# Patient Record
Sex: FEMALE | Race: WHITE | NOT HISPANIC OR LATINO | Employment: STUDENT | URBAN - METROPOLITAN AREA
[De-identification: names, ages, dates, MRNs, and addresses within clinical notes are randomized per-mention and may not be internally consistent; named-entity substitution may affect disease eponyms.]

---

## 2017-01-12 ENCOUNTER — PROCEDURE VISIT (OUTPATIENT)
Dept: OBSTETRICS AND GYNECOLOGY | Facility: CLINIC | Age: 32
End: 2017-01-12

## 2017-01-12 ENCOUNTER — ROUTINE PRENATAL (OUTPATIENT)
Dept: OBSTETRICS AND GYNECOLOGY | Facility: CLINIC | Age: 32
End: 2017-01-12

## 2017-01-12 VITALS — WEIGHT: 192.2 LBS | SYSTOLIC BLOOD PRESSURE: 132 MMHG | DIASTOLIC BLOOD PRESSURE: 70 MMHG

## 2017-01-12 DIAGNOSIS — Z36.89 ENCOUNTER FOR ULTRASOUND TO CHECK FETAL GROWTH: Primary | ICD-10-CM

## 2017-01-12 DIAGNOSIS — Z34.03 ENCOUNTER FOR SUPERVISION OF NORMAL FIRST PREGNANCY IN THIRD TRIMESTER: Primary | ICD-10-CM

## 2017-01-12 LAB
BILIRUB BLD-MCNC: NEGATIVE MG/DL
GLUCOSE UR STRIP-MCNC: NEGATIVE MG/DL
KETONES UR QL: NEGATIVE
LEUKOCYTE EST, POC: NEGATIVE
NITRITE UR-MCNC: NEGATIVE MG/ML
PH UR: 7 [PH] (ref 5–8)
PROT UR STRIP-MCNC: ABNORMAL MG/DL
RBC # UR STRIP: ABNORMAL /UL
SP GR UR: 1.02 (ref 1–1.03)
UROBILINOGEN UR QL: NORMAL

## 2017-01-12 PROCEDURE — 76816 OB US FOLLOW-UP PER FETUS: CPT | Performed by: OBSTETRICS & GYNECOLOGY

## 2017-01-12 PROCEDURE — 81002 URINALYSIS NONAUTO W/O SCOPE: CPT | Performed by: OBSTETRICS & GYNECOLOGY

## 2017-01-12 PROCEDURE — 99213 OFFICE O/P EST LOW 20 MIN: CPT | Performed by: OBSTETRICS & GYNECOLOGY

## 2017-01-12 NOTE — MR AVS SNAPSHOT
Cristina Magana   1/12/2017 3:40 PM   Appointment    Dept Phone:  [phone]   Encounter #:  [id number]    Provider:  ULTRASOUND LPFW [place]   Department:  [facility] OB GYN                Your Full Care Plan              Your Updated Medication List          This list is accurate as of: 1/12/17  3:59 PM.  Always use your most recent med list.                atorvastatin 20 MG tablet   Commonly known as:  LIPITOR       fenofibrate 145 MG tablet   Commonly known as:  TRICOR       * prenatal (CLASSIC) vitamin 28-0.8 MG tablet tablet       * prenatal vitamins 27-1 MG tablet tablet       vitamin D 45710 UNITS capsule capsule   Commonly known as:  ERGOCALCIFEROL       * Notice:  This list has 2 medication(s) that are the same as other medications prescribed for you. Read the directions carefully, and ask your doctor or other care provider to review them with you.            Instructions     None    Patient Instructions History      Upcoming Appointments     Visit Type Date Time Department    OB FOLLOWUP 1/12/2017  1:40 PM [facility] [place]    [facility]     Our records indicate that your [facility] nGAP account has been deactivated. If you would like to reactivate your account, please email [email or web address] or call [phone] to talk to our nG[employer] staff.             Other Info from Your Visit           Allergies     No Known Allergies      Vital Signs     Last Menstrual Period Smoking Status                04/18/2016 Never Smoker

## 2017-01-12 NOTE — MR AVS SNAPSHOT
Cristina Magana   1/12/2017 1:40 PM   Routine Prenatal    Dept Phone:  [phone]   Encounter #:  [id number]    Provider:  Julian Bacon MD   Department:  [facility] OB GYN                Your Full Care Plan              Your Updated Medication List          This list is accurate as of: 1/12/17  2:50 PM.  Always use your most recent med list.                atorvastatin 20 MG tablet   Commonly known as:  LIPITOR       fenofibrate 145 MG tablet   Commonly known as:  TRICOR       * prenatal (CLASSIC) vitamin 28-0.8 MG tablet tablet       * prenatal vitamins 27-1 MG tablet tablet       vitamin D 67708 UNITS capsule capsule   Commonly known as:  ERGOCALCIFEROL       * Notice:  This list has 2 medication(s) that are the same as other medications prescribed for you. Read the directions carefully, and ask your doctor or other care provider to review them with you.            We Performed the Following     POC Urinalysis Dipstick       You Were Diagnosed With        Codes Comments    Encounter for supervision of normal first pregnancy in third trimester    -  Primary ICD-10-CM: Z34.03  ICD-9-CM: V22.0       Instructions     None    Patient Instructions History      Upcoming Appointments     Visit Type Date Time Department    OB FOLLOWUP 1/12/2017  1:40 PM [facility] [place]    ULTRASOUND 1/12/2017  3:40 PM [facility] [place]      [facility] Signup     Our records indicate that your [facility] Link[employer] account has been deactivated. If you would like to reactivate your account, please email [email or web address] or call [phone] to talk to our Link[employer] staff.             Other Info from Your Visit           Your Appointments     Jan 12, 2017  3:40 PM EST   Ultrasound with ULTRASOUND LPF[facility] (--)    [facility] [place] [address]   [phone]              Allergies     No Known Allergies      Vital Signs     Blood  Pressure Weight Last Menstrual Period Smoking Status          132/70 192 lb 3.2 oz (87.2 kg) 04/18/2016 Never Smoker        Problems and Diagnoses Noted     Prenatal care    -  Primary      Results     POC Urinalysis Dipstick      Component Value Standard Range & Units    Glucose, UA Negative Negative, 1000 mg/dL (3+) mg/dL    Bilirubin Negative Negative    Ketones, UA Negative Negative    Specific Gravity  1.025 1.005 - 1.030    Blood, UA Trace Negative    pH, Urine 7.0 5.0 - 8.0    Protein,  mg/dL Negative mg/dL    Urobilinogen, UA Normal Normal    Leukocytes Negative Negative    Nitrite, UA Negative Negative

## 2017-01-26 ENCOUNTER — OFFICE VISIT (OUTPATIENT)
Dept: OBSTETRICS AND GYNECOLOGY | Facility: CLINIC | Age: 32
End: 2017-01-26

## 2017-01-26 VITALS
SYSTOLIC BLOOD PRESSURE: 120 MMHG | DIASTOLIC BLOOD PRESSURE: 74 MMHG | BODY MASS INDEX: 29.52 KG/M2 | WEIGHT: 177.2 LBS | HEIGHT: 65 IN

## 2017-01-26 PROCEDURE — 99213 OFFICE O/P EST LOW 20 MIN: CPT | Performed by: NURSE PRACTITIONER

## 2017-01-26 RX ORDER — ALUMINUM CHLORIDE 20 %
SOLUTION, NON-ORAL TOPICAL
COMMUNITY
Start: 2016-12-21 | End: 2017-01-26

## 2017-01-26 RX ORDER — OXYCODONE AND ACETAMINOPHEN 7.5; 325 MG/1; MG/1
1-2 TABLET ORAL EVERY 6 HOURS
COMMUNITY
Start: 2017-01-23 | End: 2018-08-20

## 2017-01-26 RX ORDER — IBUPROFEN 800 MG/1
800 TABLET ORAL EVERY 8 HOURS
COMMUNITY
Start: 2017-01-23 | End: 2018-02-26 | Stop reason: SDUPTHER

## 2017-01-26 NOTE — PROGRESS NOTES
Subjective   Cristina Magana is a 31 y.o. female presents for staple removal    History of Present Illness  Cristina is 7 days s/p  section. She is here for staple removal. No complaints today. Pain well controlled with percocet and ibuprofen. Reports normal bowel and bladder function. Denies fever. Denies depressed mood and reports good bonding with baby. Baby doing well per pt.     The following portions of the patient's history were reviewed and updated as appropriate: allergies, current medications, past family history, past medical history, past social history, past surgical history and problem list.    Review of Systems   Constitutional: Negative for chills, fatigue and fever.   Cardiovascular: Negative for leg swelling.   Gastrointestinal: Negative for abdominal distention and abdominal pain.   Genitourinary: Negative for difficulty urinating, dysuria, menstrual problem, pelvic pain, vaginal bleeding, vaginal discharge and vaginal pain.     Objective   Physical Exam  Cristina is alert and oriented and in no acute distress  Abdomen soft, nontender, nondistended. BS active.  Low transverse incision well approximated and healing well. Staples intact. No erythema, induration, warmth, or drainage noted. Mild swelling only.  Extremities with no swelling.    Assessment/Plan   Cristina was seen today for follow-up.    Diagnoses and all orders for this visit:    Postpartum care following  delivery      Staples removed without difficulty. Steri strips placed. Reinforced signs and symptoms of infection. Care instructions given. She is to f/u 1 week for incision check. To call for any problems before then including increasing pain or bleeding, fever, depression, or s/s infection.    Pily Faulkner, SUSAN

## 2017-01-26 NOTE — MR AVS SNAPSHOT
Cristina Magana   1/26/2017 2:30 PM   Office Visit    Dept Phone:  [phone]   Encounter #:  [id number]    Provider:  SUSAN Carpenter   Department:  [facility]                Your Full Care Plan              Today's Medication Changes          These changes are accurate as of: 1/26/17  3:35 PM.  If you have any questions, ask your nurse or doctor.               Medication(s)that have changed:     prenatal (CLASSIC) vitamin 28-0.8 MG tablet tablet   Take  by mouth daily.   What changed:  Another medication with the same name was removed. Continue taking this medication, and follow the directions you see here.   Changed by:  Julian Bacon MD         Stop taking medication(s)listed here:     atorvastatin 20 MG tablet   Commonly known as:  LIPITOR   Stopped by:  SUSAN Carpenter           DRYSOL 20 % external solution   Generic drug:  aluminum chloride   Stopped by:  SUSAN Carpenter                      Your Updated Medication List          This list is accurate as of: 1/26/17  3:35 PM.  Always use your most recent med list.                fenofibrate 145 MG tablet   Commonly known as:  TRICOR       ibuprofen 800 MG tablet   Commonly known as:  ADVIL,MOTRIN       oxyCODONE-acetaminophen 7.5-325 MG per tablet   Commonly known as:  PERCOCET       prenatal (CLASSIC) vitamin 28-0.8 MG tablet tablet       vitamin D 43957 UNITS capsule capsule   Commonly known as:  ERGOCALCIFEROL               Instructions     None    Patient Instructions History      Upcoming Appointments     Visit Type Date Time Department    GYN FOLLOW UP 1/26/2017  2:30 PM [facility] O[facility]YN [facility] [place]      Client Outlook Signup     [facility] Client Outlook allows you to send messages to your doctor, view your test results, renew your prescriptions, schedule appointments, and more. To sign up, go to Path.To and click on the Sign Up Now link in the New User? box. Enter your Client Outlook Activation  "Code exactly as it appears below along with the last four digits of your Social Security Number and your Date of Birth () to complete the sign-up process. If you do not sign up before the expiration date, you must request a new code.    [employer] Activation Code: [email or web address]  Expires: 2017  3:35 PM    If you have questions, you can email [email or web address] or call [phone] to talk to our [employer] staff. Remember, [employer] is NOT to be used for urgent needs. For medical emergencies, dial 911.               Other Info from Your Visit           Allergies     No Known Allergies      Reason for Visit     Follow-up 1 week out from the delivery      Vital Signs     Blood Pressure Height Weight Last Menstrual Period Body Mass Index Smoking Status    120/74 64.5\" (163.8 cm) 177 lb 3.2 oz (80.4 kg) 2016 29.95 kg/m2 Never Smoker        "

## 2017-01-30 ENCOUNTER — TELEPHONE (OUTPATIENT)
Dept: OBSTETRICS AND GYNECOLOGY | Facility: CLINIC | Age: 32
End: 2017-01-30

## 2017-02-06 ENCOUNTER — OFFICE VISIT (OUTPATIENT)
Dept: OBSTETRICS AND GYNECOLOGY | Facility: CLINIC | Age: 32
End: 2017-02-06

## 2017-02-06 VITALS
BODY MASS INDEX: 28.26 KG/M2 | SYSTOLIC BLOOD PRESSURE: 130 MMHG | HEIGHT: 65 IN | WEIGHT: 169.6 LBS | DIASTOLIC BLOOD PRESSURE: 78 MMHG

## 2017-02-06 DIAGNOSIS — T14.8XXA HEMATOMA: ICD-10-CM

## 2017-02-06 DIAGNOSIS — Z09 POSTOP CHECK: Primary | ICD-10-CM

## 2017-02-06 PROCEDURE — 99212 OFFICE O/P EST SF 10 MIN: CPT | Performed by: OBSTETRICS & GYNECOLOGY

## 2017-02-06 RX ORDER — OXYCODONE HYDROCHLORIDE AND ACETAMINOPHEN 5; 325 MG/1; MG/1
1 TABLET ORAL EVERY 4 HOURS PRN
Qty: 40 TABLET | Refills: 0
Start: 2017-02-06 | End: 2018-08-20

## 2017-02-06 RX ORDER — IBUPROFEN 800 MG/1
800 TABLET ORAL EVERY 8 HOURS PRN
Qty: 30 TABLET | Refills: 2
Start: 2017-02-06 | End: 2020-09-17

## 2017-02-06 NOTE — PROGRESS NOTES
Subjective  CC Incision check     Cristina Magana is a 31 y.o. female.     History of Present Illness Pt here 2 weeks postpartum for incision check. Was seen a week ago and noted to have small hematoma of incision which patient feels it is smaller today.  She has had no increased tenderness or erythema of the incision and no drainage.  She is having no fever or other problems.        Review of Systems  As in HPI only   Objective   Physical Exam  Incision today has no erythema and no unusual tenderness.  There is puffiness about an inch above the complete incision consistent with a soft hematoma but this obviously has not increased in size from last week.  Assessment/Plan   Cristina was seen today for post-op.    Diagnoses and all orders for this visit:    Postop check    Hematoma  -     CBC & Differential     We'll obtain a CBC today and if incision is unchanged patient will return to the office for her 6 week postpartum visit.  Patient has been given careful instructions to call or go to the emergency room for fever, increased tenderness or redness, expansion of the hematoma etc.  She also has been counseled about the possibility of it spontaneously evacuating which would not be a bad thing.

## 2017-02-07 LAB
BASOPHILS # BLD AUTO: 0.02 10*3/MM3 (ref 0–0.2)
BASOPHILS NFR BLD AUTO: 0.5 % (ref 0–1.5)
EOSINOPHIL # BLD AUTO: 0.1 10*3/MM3 (ref 0–0.7)
EOSINOPHIL NFR BLD AUTO: 2.3 % (ref 0.3–6.2)
ERYTHROCYTE [DISTWIDTH] IN BLOOD BY AUTOMATED COUNT: 14.8 % (ref 11.7–13)
HCT VFR BLD AUTO: 46.3 % (ref 35.6–45.5)
HGB BLD-MCNC: 14.9 G/DL (ref 11.9–15.5)
IMM GRANULOCYTES # BLD: 0 10*3/MM3 (ref 0–0.03)
IMM GRANULOCYTES NFR BLD: 0 % (ref 0–0.5)
LYMPHOCYTES # BLD AUTO: 1.61 10*3/MM3 (ref 0.9–4.8)
LYMPHOCYTES NFR BLD AUTO: 36.5 % (ref 19.6–45.3)
MCH RBC QN AUTO: 30 PG (ref 26.9–32)
MCHC RBC AUTO-ENTMCNC: 32.2 G/DL (ref 32.4–36.3)
MCV RBC AUTO: 93.2 FL (ref 80.5–98.2)
MONOCYTES # BLD AUTO: 0.29 10*3/MM3 (ref 0.2–1.2)
MONOCYTES NFR BLD AUTO: 6.6 % (ref 5–12)
NEUTROPHILS # BLD AUTO: 2.39 10*3/MM3 (ref 1.9–8.1)
NEUTROPHILS NFR BLD AUTO: 54.1 % (ref 42.7–76)
PLATELET # BLD AUTO: 338 10*3/MM3 (ref 140–500)
RBC # BLD AUTO: 4.97 10*6/MM3 (ref 3.9–5.2)
WBC # BLD AUTO: 4.41 10*3/MM3 (ref 4.5–10.7)

## 2017-03-09 ENCOUNTER — POSTPARTUM VISIT (OUTPATIENT)
Dept: OBSTETRICS AND GYNECOLOGY | Facility: CLINIC | Age: 32
End: 2017-03-09

## 2017-03-09 ENCOUNTER — PROCEDURE VISIT (OUTPATIENT)
Dept: OBSTETRICS AND GYNECOLOGY | Facility: CLINIC | Age: 32
End: 2017-03-09

## 2017-03-09 VITALS — SYSTOLIC BLOOD PRESSURE: 122 MMHG | DIASTOLIC BLOOD PRESSURE: 66 MMHG | HEIGHT: 64 IN

## 2017-03-09 DIAGNOSIS — N83.201 CYSTS OF BOTH OVARIES: Primary | ICD-10-CM

## 2017-03-09 DIAGNOSIS — N83.202 CYSTS OF BOTH OVARIES: Primary | ICD-10-CM

## 2017-03-09 PROCEDURE — 99213 OFFICE O/P EST LOW 20 MIN: CPT | Performed by: OBSTETRICS & GYNECOLOGY

## 2017-03-09 PROCEDURE — 76830 TRANSVAGINAL US NON-OB: CPT | Performed by: OBSTETRICS & GYNECOLOGY

## 2017-03-09 RX ORDER — MEDROXYPROGESTERONE ACETATE 150 MG/ML
150 INJECTION, SUSPENSION INTRAMUSCULAR
Qty: 1 ML | Refills: 3
Start: 2017-03-09 | End: 2020-09-17 | Stop reason: SDUPTHER

## 2017-03-09 NOTE — PROGRESS NOTES
Subjective    Chief Complaint   Patient presents with   • Postpartum Care     PP C SECTION 17, FEMALE 6LBS 5OZ, BOTTLE FEEDING, DISCUSS BIRTH CONTROL OPTIONS, PENTA, STILL HAVING VAG BLEEDING, INCISON IS MUCH BETTER      History of Present Illness    Cristina Magana is a 31 y.o. female who presents for postpartum exam. Pap due 2017.  Patient would like Depo-Provera shot for birth control although has not been sexually active for months.  Patient is currently on her period and may return to the office Monday for her first shot    Obstetric History:  OB History      Para Term  AB TAB SAB Ectopic Multiple Living    3 1 1  1  1   2         Menstrual History:     Patient's last menstrual period was 2016.       Past Medical History   Diagnosis Date   • Abnormal Pap smear of cervix    • Gestational hypertension    • Vitamin D deficiency      Family History   Problem Relation Age of Onset   • Hypertension Mother    • Ovarian cancer Sister    • Hypertension Sister    • Vitamin D deficiency Sister        The following portions of the patient's history were reviewed and updated as appropriate: allergies, current medications, past family history, past medical history, past social history, past surgical history and problem list.    Review of Systems   Constitutional: Negative.  Negative for fever and unexpected weight change.   HENT: Negative.    Respiratory: Negative for shortness of breath and wheezing.    Cardiovascular: Negative for chest pain, palpitations and leg swelling.   Gastrointestinal: Negative for abdominal pain, anal bleeding and blood in stool.   Genitourinary: Negative for dysuria, pelvic pain, urgency, vaginal bleeding, vaginal discharge and vaginal pain.   Skin: Negative.    Neurological: Negative.    Hematological: Negative.  Negative for adenopathy.   Psychiatric/Behavioral: Negative.  Negative for dysphoric mood. The patient is not nervous/anxious.             Objective   Physical  "Exam   Constitutional: She is oriented to person, place, and time. Vital signs are normal. She appears well-developed and well-nourished.   HENT:   Head: Normocephalic.   Neck: Trachea normal. No tracheal deviation present. No thyromegaly present.   Cardiovascular: Normal rate, regular rhythm and normal heart sounds.    No murmur heard.  Pulmonary/Chest: Effort normal and breath sounds normal.   Breasts without masses, tenderness or nipple discharge   Abdominal: Soft. Normal appearance. She exhibits no mass. There is no hepatosplenomegaly. There is no tenderness. No hernia.   Incision well-healed today with no evidence of hematoma or infection.   Genitourinary: Rectum normal, vagina normal and uterus normal. Uterus is not enlarged and not tender. Cervix exhibits no motion tenderness. Right adnexum displays no mass and no tenderness. Left adnexum displays no mass and no tenderness. No vaginal discharge found.   Genitourinary Comments: External genitalia normal    Lymphadenopathy:     She has no cervical adenopathy.     She has no axillary adenopathy.   Neurological: She is alert and oriented to person, place, and time.   Skin: Skin is warm and dry. No rash noted.   Psychiatric: She has a normal mood and affect. Her behavior is normal. Cognition and memory are normal.       Visit Vitals   • /66   • Ht 64\" (162.6 cm)   • LMP 04/18/2016   • Breastfeeding Unknown       Assessment/Plan   Cristina was seen today for postpartum care.    Diagnoses and all orders for this visit:    Encounter for postpartum visit    Other orders  -     medroxyPROGESTERone (DEPO-PROVERA) 150 MG/ML injection; Inject 1 mL into the shoulder, thigh, or buttocks Every 3 (Three) Months.      rto 4 months              "

## 2017-06-22 ENCOUNTER — PROCEDURE VISIT (OUTPATIENT)
Dept: OBSTETRICS AND GYNECOLOGY | Facility: CLINIC | Age: 32
End: 2017-06-22

## 2017-06-22 ENCOUNTER — OFFICE VISIT (OUTPATIENT)
Dept: OBSTETRICS AND GYNECOLOGY | Facility: CLINIC | Age: 32
End: 2017-06-22

## 2017-06-22 VITALS
SYSTOLIC BLOOD PRESSURE: 124 MMHG | BODY MASS INDEX: 27.72 KG/M2 | WEIGHT: 162.4 LBS | HEIGHT: 64 IN | DIASTOLIC BLOOD PRESSURE: 72 MMHG

## 2017-06-22 DIAGNOSIS — Z01.411 ENCOUNTER FOR GYNECOLOGICAL EXAMINATION WITH ABNORMAL FINDING: Primary | ICD-10-CM

## 2017-06-22 DIAGNOSIS — N83.201 CYST OF RIGHT OVARY: ICD-10-CM

## 2017-06-22 DIAGNOSIS — N83.201 CYST OF RIGHT OVARY: Primary | ICD-10-CM

## 2017-06-22 DIAGNOSIS — N89.8 VAGINAL DISCHARGE: ICD-10-CM

## 2017-06-22 PROCEDURE — 99213 OFFICE O/P EST LOW 20 MIN: CPT | Performed by: OBSTETRICS & GYNECOLOGY

## 2017-06-22 PROCEDURE — 76830 TRANSVAGINAL US NON-OB: CPT | Performed by: OBSTETRICS & GYNECOLOGY

## 2017-06-22 NOTE — PROGRESS NOTES
Subjective    Chief Complaint   Patient presents with   • Gynecologic Exam     AE, NO PERIODS SINCE  DEPO, DISCUSS US, HX OVARIAN CYST,       History of Present Illness    Cristina Magana is a 32 y.o. female who presents for annual exam. Patient had a simple 8.5 cm right ovarian cyst and 6.3 cm left ovarian cyst 3 months ago and is here for follow-up.  The cysts were seen at .  She is currently on Depo-Provera in today a repeat scan shows a normal left ovary and a 4.8 cm simple right ovarian cyst.  Obviously these are resolving and we'll repeat an ultrasound in 3 months.  She also has a mild discharge and wants a culture.    Obstetric History:  OB History      Para Term  AB TAB SAB Ectopic Multiple Living    3 1 1  1  1   2         Menstrual History:     No LMP recorded.       Past Medical History:   Diagnosis Date   • Abnormal Pap smear of cervix    • Gestational hypertension    • Ovarian cyst    • Vitamin D deficiency      Family History   Problem Relation Age of Onset   • Hypertension Mother    • Ovarian cancer Sister    • Hypertension Sister    • Vitamin D deficiency Sister        The following portions of the patient's history were reviewed and updated as appropriate: allergies, current medications, past family history, past medical history, past social history, past surgical history and problem list.    Review of Systems   Constitutional: Negative.  Negative for fever and unexpected weight change.   HENT: Negative.    Respiratory: Negative for shortness of breath and wheezing.    Cardiovascular: Negative for chest pain, palpitations and leg swelling.   Gastrointestinal: Negative for abdominal pain, anal bleeding and blood in stool.   Genitourinary: Positive for vaginal discharge. Negative for dysuria, pelvic pain, urgency, vaginal bleeding and vaginal pain.   Skin: Negative.    Neurological: Negative.    Hematological: Negative.  Negative for adenopathy.   Psychiatric/Behavioral: Negative.   "Negative for dysphoric mood. The patient is not nervous/anxious.             Objective   Physical Exam   Constitutional: She is oriented to person, place, and time. Vital signs are normal. She appears well-developed and well-nourished.   HENT:   Head: Normocephalic.   Neck: Trachea normal. No tracheal deviation present. No thyromegaly present.   Cardiovascular: Normal rate, regular rhythm and normal heart sounds.    No murmur heard.  Pulmonary/Chest: Effort normal and breath sounds normal.   Breasts without masses, tenderness or nipple discharge   Abdominal: Soft. Normal appearance. She exhibits no mass. There is no hepatosplenomegaly. There is no tenderness. No hernia.   Genitourinary: Rectum normal and uterus normal. Uterus is not enlarged and not tender. Cervix exhibits no motion tenderness. Right adnexum displays no mass and no tenderness. Left adnexum displays no mass and no tenderness. Vaginal discharge found.   Genitourinary Comments: External genitalia normal    Lymphadenopathy:     She has no cervical adenopathy.     She has no axillary adenopathy.   Neurological: She is alert and oriented to person, place, and time.   Skin: Skin is warm and dry. No rash noted.   Psychiatric: She has a normal mood and affect. Her behavior is normal. Cognition and memory are normal.       /72  Ht 64\" (162.6 cm)  Wt 162 lb 6.4 oz (73.7 kg)  BMI 27.88 kg/m2    Assessment/Plan   Cristina was seen today for gynecologic exam.    Diagnoses and all orders for this visit:    Encounter for gynecological examination with abnormal finding  -     IGP,rfx Aptima HPV All Pth    Cyst of right ovary    Vaginal discharge  -     NuSwab VG+      Pelvic ultrasound.  and FU in 3 months                "

## 2017-06-24 LAB
CONV .: NORMAL
CYTOLOGIST CVX/VAG CYTO: NORMAL
CYTOLOGY CVX/VAG DOC THIN PREP: NORMAL
DX ICD CODE: NORMAL
HIV 1 & 2 AB SER-IMP: NORMAL
OTHER STN SPEC: NORMAL
PATH REPORT.FINAL DX SPEC: NORMAL
STAT OF ADQ CVX/VAG CYTO-IMP: NORMAL

## 2017-06-27 ENCOUNTER — TELEPHONE (OUTPATIENT)
Dept: OBSTETRICS AND GYNECOLOGY | Facility: HOSPITAL | Age: 32
End: 2017-06-27

## 2017-06-27 ENCOUNTER — TELEPHONE (OUTPATIENT)
Dept: OBSTETRICS AND GYNECOLOGY | Facility: CLINIC | Age: 32
End: 2017-06-27

## 2017-06-27 LAB
A VAGINAE DNA VAG QL NAA+PROBE: NORMAL SCORE
BVAB2 DNA VAG QL NAA+PROBE: NORMAL SCORE
C ALBICANS DNA VAG QL NAA+PROBE: NEGATIVE
C GLABRATA DNA VAG QL NAA+PROBE: NEGATIVE
C TRACH RRNA SPEC QL NAA+PROBE: NEGATIVE
MEGA1 DNA VAG QL NAA+PROBE: NORMAL SCORE
N GONORRHOEA RRNA SPEC QL NAA+PROBE: NEGATIVE
T VAGINALIS RRNA SPEC QL NAA+PROBE: NEGATIVE

## 2017-09-21 ENCOUNTER — OFFICE VISIT (OUTPATIENT)
Dept: OBSTETRICS AND GYNECOLOGY | Facility: CLINIC | Age: 32
End: 2017-09-21

## 2017-09-21 ENCOUNTER — PROCEDURE VISIT (OUTPATIENT)
Dept: OBSTETRICS AND GYNECOLOGY | Facility: CLINIC | Age: 32
End: 2017-09-21

## 2017-09-21 VITALS
HEIGHT: 67 IN | DIASTOLIC BLOOD PRESSURE: 74 MMHG | SYSTOLIC BLOOD PRESSURE: 132 MMHG | WEIGHT: 158 LBS | BODY MASS INDEX: 24.8 KG/M2

## 2017-09-21 DIAGNOSIS — N83.201 CYST OF RIGHT OVARY: Primary | ICD-10-CM

## 2017-09-21 DIAGNOSIS — N83.201 RIGHT OVARIAN CYST: Primary | ICD-10-CM

## 2017-09-21 PROCEDURE — 99212 OFFICE O/P EST SF 10 MIN: CPT | Performed by: OBSTETRICS & GYNECOLOGY

## 2017-09-21 PROCEDURE — 76830 TRANSVAGINAL US NON-OB: CPT | Performed by: OBSTETRICS & GYNECOLOGY

## 2017-09-21 NOTE — PROGRESS NOTES
Subjective CC follow-up on right ovarian cyst.  Cristina Magana is a 32 y.o. female.     History of Present Illness patient was noted at  8 months ago to have an extremely large right ovarian cyst well over 10 cm.  It is been followed an ultrasound today shows a very small 2.1 cm simple right ovarian cyst which is resolving nicely.  Patient having no symptoms.  Left ovary was normal.    The following portions of the patient's history were reviewed and updated as appropriate: past surgical history and problem list.    Review of Systems  As per hPI   Objective   Physical Exam  Not needed   Assessment/Plan   Cristina was seen today for follow-up.    Diagnoses and all orders for this visit:    Right ovarian cyst     Small simple right ovarian cyst obviously resolving and benign.  We'll recheck at next annual exam in 2018.

## 2018-02-26 ENCOUNTER — OFFICE VISIT (OUTPATIENT)
Dept: GASTROENTEROLOGY | Facility: CLINIC | Age: 33
End: 2018-02-26

## 2018-02-26 VITALS
DIASTOLIC BLOOD PRESSURE: 86 MMHG | SYSTOLIC BLOOD PRESSURE: 136 MMHG | WEIGHT: 153 LBS | TEMPERATURE: 98.3 F | HEIGHT: 65 IN | BODY MASS INDEX: 25.49 KG/M2

## 2018-02-26 DIAGNOSIS — R10.10 PAIN OF UPPER ABDOMEN: Primary | ICD-10-CM

## 2018-02-26 DIAGNOSIS — R10.30 LOWER ABDOMINAL PAIN: ICD-10-CM

## 2018-02-26 PROCEDURE — 99204 OFFICE O/P NEW MOD 45 MIN: CPT | Performed by: INTERNAL MEDICINE

## 2018-02-26 RX ORDER — DICYCLOMINE HCL 20 MG
TABLET ORAL
COMMUNITY
Start: 2018-02-22 | End: 2018-03-14 | Stop reason: SDUPTHER

## 2018-02-26 NOTE — PROGRESS NOTES
Chief Complaint   Patient presents with   • Abdominal Pain     all over (comes and goes)       Cristina Magana is a 32 y.o. female who presents with Abdominal pain for one year    HPI Comments: 32-year-old with abdominal pain for one year.  Is in the lower quadrants bilaterally.  Does not radiate.  Worse with movement.  Better at rest.  No relation to bowel movements.  It is crampy and colicky in nature.  She's had a full gynecological workup with pelvic ultrasound that was within normal limits.  Emergency room visit last week yielded a normal CAT scan, CBC and CMP.  She is here to schedule scopes for upper and lower abdominal pain.      Past Medical History:   Diagnosis Date   • Abnormal Pap smear of cervix    • Gestational hypertension    • Ovarian cyst    • Vitamin D deficiency        Past Surgical History:   Procedure Laterality Date   •  SECTION     • TONSILLECTOMY           Current Outpatient Prescriptions:   •  dicyclomine (BENTYL) 20 MG tablet, , Disp: , Rfl:   •  medroxyPROGESTERone (DEPO-PROVERA) 150 MG/ML injection, Inject 1 mL into the shoulder, thigh, or buttocks Every 3 (Three) Months., Disp: 1 mL, Rfl: 3  •  vitamin D (ERGOCALCIFEROL) 04163 UNITS capsule capsule, TAKE ONE CAPSULE BY MOUTH ONCE WEEKLY, Disp: , Rfl:   •  fenofibrate (TRICOR) 145 MG tablet, TAKE ONE TABLET BY MOUTH DAILY, Disp: , Rfl:   •  ibuprofen (ADVIL,MOTRIN) 800 MG tablet, Take 1 tablet by mouth Every 8 (Eight) Hours As Needed for mild pain (1-3)., Disp: 30 tablet, Rfl: 2  •  oxyCODONE-acetaminophen (PERCOCET) 5-325 MG per tablet, Take 1 tablet by mouth Every 4 (Four) Hours As Needed for severe pain (7-10)., Disp: 40 tablet, Rfl: 0  •  oxyCODONE-acetaminophen (PERCOCET) 7.5-325 MG per tablet, Take 1-2 tablets by mouth Every 6 (Six) Hours., Disp: , Rfl:   •  Prenatal Vit-Fe Fumarate-FA (PRENATAL, CLASSIC, VITAMIN) 28-0.8 MG tablet tablet, Take  by mouth daily., Disp: , Rfl:     No Known Allergies    Social History     Social  History   • Marital status: Single     Spouse name: N/A   • Number of children: N/A   • Years of education: N/A     Occupational History   • Not on file.     Social History Main Topics   • Smoking status: Current Every Day Smoker     Packs/day: 2.00   • Smokeless tobacco: Never Used      Comment: 2 PPD   • Alcohol use Yes      Comment: OCCAS BEFORE PREG   • Drug use: No   • Sexual activity: Not Currently     Partners: Male     Other Topics Concern   • Not on file     Social History Narrative       Family History   Problem Relation Age of Onset   • Hypertension Mother    • Ovarian cancer Sister    • Hypertension Sister    • Vitamin D deficiency Sister        Review of Systems   Gastrointestinal: Positive for abdominal distention, abdominal pain, anal bleeding, nausea and rectal pain. Negative for diarrhea.   All other systems reviewed and are negative.      Vitals:    02/26/18 1330   BP: 136/86   Temp: 98.3 °F (36.8 °C)       Physical Exam   Constitutional: She is oriented to person, place, and time. She appears well-developed and well-nourished.   HENT:   Head: Normocephalic and atraumatic.   Eyes: Pupils are equal, round, and reactive to light.   Cardiovascular: Normal rate, regular rhythm and normal heart sounds.    Pulmonary/Chest: Effort normal and breath sounds normal.   Abdominal: Soft. Bowel sounds are normal. She exhibits distension. She exhibits no shifting dullness, no pulsatile liver, no fluid wave, no abdominal bruit, no ascites, no pulsatile midline mass and no mass. There is no hepatosplenomegaly. There is tenderness. There is no rigidity and no guarding. No hernia.   Musculoskeletal: Normal range of motion.   Neurological: She is alert and oriented to person, place, and time.   Skin: Skin is warm and dry.   Psychiatric: She has a normal mood and affect. Her behavior is normal. Thought content normal.   Nursing note and vitals reviewed.      Problem list    Abdominal pain upper  Abdominal pain  lower  Negative CAT scan and lab work  Never pelvic ultrasound      Assessment/Plan    We are going to move for EGD and colonoscopy this week for abdominal pain of one year of unknown etiology  She can continue Bentyl that she is taking for pain    If the scopes are unrevealing we would move to [place] for functional abd pain with a TCA

## 2018-02-28 ENCOUNTER — TELEPHONE (OUTPATIENT)
Dept: GASTROENTEROLOGY | Facility: CLINIC | Age: 33
End: 2018-02-28

## 2018-02-28 ENCOUNTER — OUTSIDE FACILITY SERVICE (OUTPATIENT)
Dept: GASTROENTEROLOGY | Facility: CLINIC | Age: 33
End: 2018-02-28

## 2018-02-28 PROCEDURE — 43239 EGD BIOPSY SINGLE/MULTIPLE: CPT | Performed by: INTERNAL MEDICINE

## 2018-02-28 PROCEDURE — 45378 DIAGNOSTIC COLONOSCOPY: CPT | Performed by: INTERNAL MEDICINE

## 2018-02-28 RX ORDER — DESIPRAMINE HYDROCHLORIDE 25 MG/1
25 TABLET ORAL 2 TIMES DAILY
Qty: 60 TABLET | Refills: 2 | Status: SHIPPED | OUTPATIENT
Start: 2018-02-28 | End: 2018-03-14 | Stop reason: SDUPTHER

## 2018-03-02 ENCOUNTER — TELEPHONE (OUTPATIENT)
Dept: GASTROENTEROLOGY | Facility: CLINIC | Age: 33
End: 2018-03-02

## 2018-03-02 NOTE — TELEPHONE ENCOUNTER
----- Message from Maryann Kim sent at 3/2/2018  2:21 PM EST -----  Regarding: PT CALLED - RELEASE TO RETURN TO WORK    Contact: [phone]  PT ASK IF SHE COULD  NOTE ON Monday. SHE WOULD LIKE TO GO BACK TO WORK ON TUES 03/06. PT HAS BEEN OF SINCE 02/19. PT HAD PROCEDURE ON WED. DR TEAGUE WAS TO CALL IN A SCRIPT. PHARM DIDN'T REC.

## 2018-03-02 NOTE — TELEPHONE ENCOUNTER
Called pt back... No answer after multiple rings; left a message for call back.   Desipramine was e-scribed on 2/28 to Jose on [address]

## 2018-03-02 NOTE — TELEPHONE ENCOUNTER
Pt returned my call per a staff message.    Called pt back. Pt states she has been in and out of the hospital and seen several MDs since 2/18/18 for abdominal pain. She saw Dr Chapa in clinic on 2/26/18 and then had her scopes done 2/28/18. She states at the time of her scopes, Dr Chapa mentioned he would call a script in to her pharmacy and she wanted to make sure that was done. Advised yes, script was called into her [facility] pharmacy on [address] on the day of her scopes. She states she will pick her script. Pt states she has missed quite a bit of work for being ill. She has been off work since February 19th with abdominal pain/hosp visits/MD visits. She states she needs a work note for the time she has missed and also a release to return to work either Monday or Tuesday. Advised will check with MD and see if we can provide her wit that. Pt states she also noticed today that her stool was black. Asked how long her stool has been black, or has been happened to her before? Pt states this has been happening off and on since she has been sick but no one can find anything wrong with her. Advised that normally, black stool (true coal black stool, not just dark brown) means that someone is loosing blood in the GI tract. Asked if she has started iron or Pepto bismol recently. Pt states no. Advised that if stool is truly black and not dark brown in color, she should proceed to the ER for an evaluation. Pt verb understanding.

## 2018-03-05 NOTE — TELEPHONE ENCOUNTER
"Per Dr Chapa: \"That's fine\"     Called pt and advised that Dr Chapa gave the OK to write a work note for her to return to work today or tomorrow. Advised can write the note for her and can either leave it at the  of our office or mail it to her. Pt verb understanding and stets she will come pick the note up.     Letter written, printed and signed. Left at  for pt.   "

## 2018-03-14 ENCOUNTER — OFFICE VISIT (OUTPATIENT)
Dept: GASTROENTEROLOGY | Facility: CLINIC | Age: 33
End: 2018-03-14

## 2018-03-14 VITALS
WEIGHT: 154.2 LBS | SYSTOLIC BLOOD PRESSURE: 110 MMHG | BODY MASS INDEX: 25.69 KG/M2 | HEIGHT: 65 IN | DIASTOLIC BLOOD PRESSURE: 82 MMHG | TEMPERATURE: 99.2 F

## 2018-03-14 DIAGNOSIS — R10.9 ABDOMINAL CRAMPS: ICD-10-CM

## 2018-03-14 DIAGNOSIS — R10.9 ABDOMINAL PAIN, UNSPECIFIED ABDOMINAL LOCATION: Primary | ICD-10-CM

## 2018-03-14 PROCEDURE — 99214 OFFICE O/P EST MOD 30 MIN: CPT | Performed by: NURSE PRACTITIONER

## 2018-03-14 RX ORDER — DESIPRAMINE HYDROCHLORIDE 25 MG/1
25 TABLET ORAL 2 TIMES DAILY
Qty: 60 TABLET | Refills: 3 | Status: SHIPPED | OUTPATIENT
Start: 2018-03-14 | End: 2018-08-20 | Stop reason: SDUPTHER

## 2018-03-14 RX ORDER — DICYCLOMINE HCL 20 MG
20 TABLET ORAL EVERY 6 HOURS
Qty: 90 TABLET | Refills: 3 | Status: SHIPPED | OUTPATIENT
Start: 2018-03-14 | End: 2019-02-12 | Stop reason: SDUPTHER

## 2018-03-14 NOTE — PROGRESS NOTES
Chief Complaint   Patient presents with   • Abdominal Pain   • Follow-up       Cristina Magana is a  32 y.o. female here for a follow up visit for abdominal pain.     HPI  31 yo f presents today for follow up for functional abdominal pain. She is a patient of Dr. Chapa. She has had extensive workup for the abdominal pain.  She underwent EGD and colonoscopy with Dr. Chapa on 18. EGD was unremarkable. Colonoscopy showed NBIH. She was last started on desipramine 25 mg BID on 18. She admits since starting the medication she has felt the best she has felt in years. She was tearful in the exam room today telling me how wonderful she has felt. She admits her abdominal pain has completely resolved. She admits her family and her are scared of the medication side effects after reading the pamphlet from the pharmacy about the medication. She is wondering if she should come off of it. She is also wanting to know what test it was that Dr. Chapa wanted her to have done after the scopes. She never had it scheduled. She denies any dysphagia, reflux, abd pain, N&V, diarrhea, constipation, rectal bleeding or melena. She also has been taking bentyl and admits it helps to but nothing like the TCA.   Past Medical History:   Diagnosis Date   • Abnormal Pap smear of cervix    • Gestational hypertension    • Ovarian cyst    • Vitamin D deficiency        Past Surgical History:   Procedure Laterality Date   •  SECTION     • COLONOSCOPY  2018    [facility]   • TONSILLECTOMY     • UPPER GASTROINTESTINAL ENDOSCOPY  2018       Scheduled Meds:    Continuous Infusions:  No current facility-administered medications for this visit.     PRN Meds:.    No Known Allergies    Social History     Social History   • Marital status: Single     Spouse name: N/A   • Number of children: N/A   • Years of education: N/A     Occupational History   • Not on file.     Social History Main Topics   • Smoking status: Current Every Day Smoker      Packs/day: 1.00     Types: Cigarettes   • Smokeless tobacco: Never Used      Comment: 2 PPD   • Alcohol use Yes      Comment: OCCAS BEFORE PREG   • Drug use: No   • Sexual activity: Not Currently     Partners: Male     Other Topics Concern   • Not on file     Social History Narrative   • No narrative on file       Family History   Problem Relation Age of Onset   • Hypertension Mother    • Ovarian cancer Sister    • Hypertension Sister    • Vitamin D deficiency Sister        Review of Systems   Constitutional: Negative for appetite change, chills, diaphoresis, fatigue, fever and unexpected weight change.   HENT: Negative for nosebleeds, postnasal drip, sore throat, trouble swallowing and voice change.    Respiratory: Negative for cough, choking, chest tightness, shortness of breath and wheezing.    Cardiovascular: Negative for chest pain.   Gastrointestinal: Negative for abdominal distention, abdominal pain, anal bleeding, blood in stool, constipation, diarrhea, nausea and vomiting.   Endocrine: Negative for polydipsia, polyphagia and polyuria.   Musculoskeletal: Negative for gait problem.   Skin: Negative for rash and wound.   Allergic/Immunologic: Negative for food allergies.   Neurological: Negative for dizziness, speech difficulty and light-headedness.   Psychiatric/Behavioral: Negative for confusion, self-injury, sleep disturbance and suicidal ideas.       Vitals:    03/14/18 1437   BP: 110/82   Temp: 99.2 °F (37.3 °C)       Physical Exam   Constitutional: She is oriented to person, place, and time. She appears well-developed and well-nourished. She does not appear ill. No distress.   HENT:   Head: Normocephalic.   Eyes: Pupils are equal, round, and reactive to light.   Cardiovascular: Normal rate, regular rhythm and normal heart sounds.    Pulmonary/Chest: Effort normal and breath sounds normal.   Abdominal: Soft. Bowel sounds are normal. She exhibits no distension and no mass. There is no hepatosplenomegaly.  There is no tenderness. There is no rebound and no guarding. No hernia.   Musculoskeletal: Normal range of motion.   Neurological: She is alert and oriented to person, place, and time.   Skin: Skin is warm and dry.   Psychiatric: She has a normal mood and affect. Her speech is normal and behavior is normal. Judgment normal.       No images are attached to the encounter.    Assessment & Plan     1. Abdominal pain, unspecified abdominal location  - FL Upper GI With Air Contrast & SBFT; Future    2. Abdominal cramps  - FL Upper GI With Air Contrast & SBFT; Future    I do believe the abdominal pain is functional in nature given that the TCA has made such a difference. I do think there might be a depression component to this as well. Patient admits not only has her pain been gone she has felt so much more energy and is able to concentrate better and get more done at home and work. She even admitted to me her mood was so much better. She told me she hadn't felt this overall well in years. She has been in a custody cervantes and under a lot of stress. She denied any suicidal ideation but still denies she might be suffering from depression. She agrees to continue the TCA for now. We will still get the SBFT per Dr. Chapa recommendation. I did spend more than 50% of the visit discussing the safety profile of the TCA and possible side effects. I advised her to never come off of it cold turkey and to call the office with any issues. Follow up with Dr. Chapa in 3 months or sooner as needed.

## 2018-03-21 ENCOUNTER — TELEPHONE (OUTPATIENT)
Dept: GASTROENTEROLOGY | Facility: CLINIC | Age: 33
End: 2018-03-21

## 2018-03-21 NOTE — TELEPHONE ENCOUNTER
Returned patient's phone call. She states she was going to ask if it was ok to take stool softeners; states she has already taken it. Advised it was ok to take stool softeners for occasional constipation. She verb understanding.

## 2018-03-21 NOTE — TELEPHONE ENCOUNTER
----- Message from Nikole Barksdale sent at 3/21/2018  8:23 AM EDT -----  Regarding: constipation  Contact: [phone]  Pt called complain of constipation..

## 2018-03-28 ENCOUNTER — HOSPITAL ENCOUNTER (OUTPATIENT)
Dept: GENERAL RADIOLOGY | Facility: HOSPITAL | Age: 33
Discharge: HOME | End: 2018-03-28
Admitting: NURSE PRACTITIONER

## 2018-03-28 DIAGNOSIS — R10.9 ABDOMINAL PAIN, UNSPECIFIED ABDOMINAL LOCATION: ICD-10-CM

## 2018-03-28 DIAGNOSIS — R10.9 ABDOMINAL CRAMPS: ICD-10-CM

## 2018-03-28 PROCEDURE — 74250 X-RAY XM SM INT 1CNTRST STD: CPT

## 2018-03-28 RX ADMIN — BARIUM SULFATE 366 ML: 960 POWDER, FOR SUSPENSION ORAL at 10:50

## 2018-04-02 ENCOUNTER — TELEPHONE (OUTPATIENT)
Dept: GASTROENTEROLOGY | Facility: CLINIC | Age: 33
End: 2018-04-02

## 2018-04-02 NOTE — TELEPHONE ENCOUNTER
----- Message from Lacie Cornejo sent at 4/2/2018 11:08 AM EDT -----  Regarding: Blood in stool  Contact: [phone]  Pt noticed bright red blood in stool for a few days since her sbft on March 28th. She does not see it on the toilet paper but only in her stool. Also she would like results from her testing. Please call her.

## 2018-04-02 NOTE — TELEPHONE ENCOUNTER
I agree with all your recommendations. Have her take the stool softeners more routinely and she can take metamucil daily. Have her call us back in a few weeks if that doesn't work. Thanks.

## 2018-04-02 NOTE — TELEPHONE ENCOUNTER
Patient called, no answer, message left.  Advise Dinah NP agreed with the changes and to call back with an update or any questions.

## 2018-04-02 NOTE — TELEPHONE ENCOUNTER
----- Message from Lacie Cornejo sent at 4/2/2018 11:08 AM EDT -----  Regarding: Blood in stool  Contact: [phone]  Pt noticed bright red blood in stool for a few days since her sbft on March 28th. She does not see it on the toilet paper but only in her stool. Also she would like results from her testing. Please call her.

## 2018-04-02 NOTE — TELEPHONE ENCOUNTER
Returned patient's phone call. Advised as per Dinah NP's note that her SBFT test was normal. She states she has very little abdominal pain. The only time she experiences abdominal pain is when she becomes constipated and has to strain for a BM. She has notice a small amount of blood in her stool; more so since she has had to strain for a BM.  States the constipation and bright red blood present in her stool has been happening more frequently since her SBFT. Advised patient she should be drinking 8-10 8 oz glasses of water a day. She states she has been and has been taking a stool softener every 2-3 days. Questions if she should take Metamucil. Advised to increase her stool softener to daily and she may take Metamucil. Advised she will need to drink plenty of water with the Metamucil. Advise if she does not it could increase her constipation issues. Advised will send an update to Dinah regarding her constipation issues along with the bright red blood present in her stool. She verb understanding of the above and is agreeable to the plan.

## 2018-04-02 NOTE — TELEPHONE ENCOUNTER
----- Message from Lacie Cornejo sent at 4/2/2018 11:08 AM EDT -----  Regarding: Blood in stool  Contact: [phone]  Pt noticed bright red blood in stool for a few days since her sbft on March 28th. She does not see it on the toilet paper but only in her stool. Also she would like results from her testing. Please call her.

## 2018-04-02 NOTE — TELEPHONE ENCOUNTER
Notes Recorded by SUSAN Godoy on 3/29/2018 at 11:17 AM EDT  Please call patient and let her know the SBFT was normal. Is she still doing great on the TCA? Thanks.  ------    Notes Recorded by SUSAN Godoy on 3/28/2018 at 4:39 PM EDT  Is this a normal reading? Patient was doing fantastic on the TCA at my last office visit with her. She denied having anymore abd pain.

## 2018-04-04 ENCOUNTER — TELEPHONE (OUTPATIENT)
Dept: GASTROENTEROLOGY | Facility: CLINIC | Age: 33
End: 2018-04-04

## 2018-04-04 NOTE — TELEPHONE ENCOUNTER
----- Message from Wan Chapa MD sent at 3/8/2018 12:24 PM EST -----  Pathology benign, schedule a small bowel follow-through for abdominal pain and office visit with Dinah in 8 weeks

## 2018-05-18 ENCOUNTER — TELEPHONE (OUTPATIENT)
Dept: GASTROENTEROLOGY | Facility: CLINIC | Age: 33
End: 2018-05-18

## 2018-05-18 NOTE — TELEPHONE ENCOUNTER
Returned patient's phone call. She states while the Despiramine is working to help control her symptoms, its keeping her awake at night.  She states she is going back to work on 6/1 and would like to know what she should should do change medications or cut back on her current dose of medication. Advised an update will be sent to Dinah CHEEMA for advisement. She verb understanding.

## 2018-05-18 NOTE — TELEPHONE ENCOUNTER
----- Message from Lacie Cornejo sent at 5/18/2018  2:10 PM EDT -----  Regarding: medication  Contact: [phone]  Pt is having trouble sleeping since she has been taking Besipramine prescribed by Dr Chapa. She is getting ready to go back to work and needs something that doesn't keep her awake at night. Thanks

## 2018-08-20 ENCOUNTER — OFFICE VISIT (OUTPATIENT)
Dept: GASTROENTEROLOGY | Facility: CLINIC | Age: 33
End: 2018-08-20

## 2018-08-20 VITALS
DIASTOLIC BLOOD PRESSURE: 88 MMHG | HEIGHT: 65 IN | TEMPERATURE: 97.9 F | WEIGHT: 149 LBS | SYSTOLIC BLOOD PRESSURE: 122 MMHG | BODY MASS INDEX: 24.83 KG/M2

## 2018-08-20 DIAGNOSIS — R10.84 GENERALIZED ABDOMINAL PAIN: Primary | ICD-10-CM

## 2018-08-20 DIAGNOSIS — R10.9 ABDOMINAL CRAMPING: ICD-10-CM

## 2018-08-20 PROCEDURE — 99213 OFFICE O/P EST LOW 20 MIN: CPT | Performed by: NURSE PRACTITIONER

## 2018-08-20 RX ORDER — SPIRONOLACTONE 50 MG/1
50 TABLET, FILM COATED ORAL DAILY
COMMUNITY
Start: 2018-08-14 | End: 2020-09-17

## 2018-08-20 RX ORDER — DESIPRAMINE HYDROCHLORIDE 25 MG/1
25 TABLET ORAL 2 TIMES DAILY
Qty: 60 TABLET | Refills: 11 | Status: SHIPPED | OUTPATIENT
Start: 2018-08-20 | End: 2019-02-12 | Stop reason: SDUPTHER

## 2018-08-20 NOTE — PROGRESS NOTES
Chief Complaint   Patient presents with   • Follow-up     Abdominal pain       Cristina Magana is a  33 y.o. female here for a follow up visit for chronic abd pain and cramping.     HPI  32 yo f presents today for follow up visit for chronic functional abd pain and cramping. She is a patient of Dr. Chapa. She was last seen in the office by me on 3/14/18. She has had extensive workup for her chronic abd pain and cramping. Workup was negative. She was started on norpramin 25 mg BID and she has done well on it. She denies any GI issues today. She is still having a lot of stress in her life mainly family stress but she is excited to report she started a new job. She denies any dysphagia, reflux, abd pain, N&V, diarrhea, constipation, rectal bleeding or melena. She admits her appetite is better and her weight is stable.     Past Medical History:   Diagnosis Date   • Abnormal Pap smear of cervix    • Gestational hypertension    • Ovarian cyst    • Vitamin D deficiency        Past Surgical History:   Procedure Laterality Date   •  SECTION     • COLONOSCOPY  2018    [facility]   • TONSILLECTOMY     • UPPER GASTROINTESTINAL ENDOSCOPY  2018    Normal       Scheduled Meds:    Continuous Infusions:  No current facility-administered medications for this visit.     PRN Meds:.    No Known Allergies    Social History     Social History   • Marital status: Single     Spouse name: N/A   • Number of children: N/A   • Years of education: N/A     Occupational History   • Not on file.     Social History Main Topics   • Smoking status: Current Every Day Smoker     Packs/day: 1.00     Types: Cigarettes   • Smokeless tobacco: Never Used      Comment: 2 PPD   • Alcohol use Yes      Comment: OCCAS BEFORE PREG   • Drug use: No   • Sexual activity: Not Currently     Partners: Male     Other Topics Concern   • Not on file     Social History Narrative   • No narrative on file       Family History   Problem Relation Age of Onset   •  Hypertension Mother    • Ovarian cancer Sister    • Hypertension Sister    • Vitamin D deficiency Sister        Review of Systems   Constitutional: Positive for fatigue. Negative for appetite change, chills, diaphoresis, fever and unexpected weight change.   HENT: Negative for nosebleeds, postnasal drip, sore throat, trouble swallowing and voice change.    Respiratory: Negative for cough, choking, chest tightness, shortness of breath and wheezing.    Cardiovascular: Negative for chest pain.   Gastrointestinal: Negative for abdominal distention, abdominal pain, anal bleeding, blood in stool, constipation, diarrhea, nausea, rectal pain and vomiting.   Endocrine: Negative for polydipsia, polyphagia and polyuria.   Musculoskeletal: Negative for gait problem.   Skin: Negative for rash and wound.   Allergic/Immunologic: Negative for food allergies.   Neurological: Negative for dizziness, speech difficulty and light-headedness.   Psychiatric/Behavioral: Negative for confusion, self-injury, sleep disturbance and suicidal ideas.       Vitals:    08/20/18 0856   BP: 122/88   Temp: 97.9 °F (36.6 °C)       Physical Exam   Constitutional: She is oriented to person, place, and time. She appears well-developed and well-nourished. She does not appear ill. No distress.   HENT:   Head: Normocephalic.   Eyes: Pupils are equal, round, and reactive to light.   Cardiovascular: Normal rate, regular rhythm and normal heart sounds.    Pulmonary/Chest: Effort normal and breath sounds normal.   Abdominal: Soft. Bowel sounds are normal. She exhibits no distension and no mass. There is no hepatosplenomegaly. There is no tenderness. There is no rebound and no guarding. No hernia.   Musculoskeletal: Normal range of motion.   Neurological: She is alert and oriented to person, place, and time.   Skin: Skin is warm and dry.   Psychiatric: She has a normal mood and affect. Her speech is normal and behavior is normal. Judgment normal.       No images  are attached to the encounter.    Assessment & plan     1. Generalized abdominal pain  - desipramine (NORPRAMIN) 25 MG tablet; Take 1 tablet by mouth 2 (Two) Times a Day.  Dispense: 60 tablet; Refill: 11    2. Abdominal cramping    Abd pain and cramping has been well controlled on Norpramin 25 mg BID. She denies any GI issues today. Follow up with me or Dr. Chapa in 1 year. Call office with any issues.

## 2019-02-12 ENCOUNTER — OFFICE VISIT (OUTPATIENT)
Dept: GASTROENTEROLOGY | Facility: CLINIC | Age: 34
End: 2019-02-12

## 2019-02-12 VITALS
TEMPERATURE: 98.7 F | HEIGHT: 65 IN | SYSTOLIC BLOOD PRESSURE: 128 MMHG | BODY MASS INDEX: 26.79 KG/M2 | WEIGHT: 160.8 LBS | DIASTOLIC BLOOD PRESSURE: 86 MMHG

## 2019-02-12 DIAGNOSIS — R10.84 GENERALIZED ABDOMINAL PAIN: ICD-10-CM

## 2019-02-12 DIAGNOSIS — R10.9 FUNCTIONAL ABDOMINAL PAIN SYNDROME: Primary | ICD-10-CM

## 2019-02-12 PROCEDURE — 99214 OFFICE O/P EST MOD 30 MIN: CPT | Performed by: NURSE PRACTITIONER

## 2019-02-12 RX ORDER — DESIPRAMINE HYDROCHLORIDE 25 MG/1
25 TABLET ORAL 2 TIMES DAILY
Qty: 60 TABLET | Refills: 11 | Status: SHIPPED | OUTPATIENT
Start: 2019-02-12 | End: 2019-11-08 | Stop reason: SDUPTHER

## 2019-02-12 RX ORDER — DICYCLOMINE HCL 20 MG
20 TABLET ORAL EVERY 6 HOURS
Qty: 90 TABLET | Refills: 3 | Status: SHIPPED | OUTPATIENT
Start: 2019-02-12 | End: 2020-09-17

## 2019-02-12 NOTE — PROGRESS NOTES
Chief Complaint   Patient presents with   • Follow-up   • Abdominal Pain   • Abdominal Cramping       Cristina Magana is a  33 y.o. female here for a follow up visit for chronic abd pain.     HPI  34 yo f presents today for follow up visit for chronic functional abd pain. She is a patient of Dr. Chapa. She was last seen in the office 18. She has chronic abd pain and normally does well with the desipramine BID. She admits last month she was due to run out of her medication and since she couldn't get an earlier appt with me she started only taking the medicine daily instead of BID. Since then she admits her abd pain has come back and she has also been more emotional and anxious. She hasn't been eating or sleeping well for the past few weeks. She also admits she has been more stressed since starting a new job and getting a new car that is used and is already having problems. She has a counselor she sees routinely but admits she hasnt been able to get in with her given her new work schedule. She is tearful today telling me all of this. She admits she needs to get back on the medicine twice daily as before. She denies any dysphagia, reflux, N&V, constipation, rectal bleeding or melena. She has been having more issues with bloating and diarrhea since going to daily from BID.     Past Medical History:   Diagnosis Date   • Abnormal Pap smear of cervix    • Gestational hypertension    • Ovarian cyst    • Vitamin D deficiency        Past Surgical History:   Procedure Laterality Date   •  SECTION     • COLONOSCOPY  2018    [facility]   • TONSILLECTOMY     • UPPER GASTROINTESTINAL ENDOSCOPY  2018    Normal       Scheduled Meds:    Continuous Infusions:  No current facility-administered medications for this visit.     PRN Meds:.    No Known Allergies    Social History     Socioeconomic History   • Marital status: Single     Spouse name: Not on file   • Number of children: Not on file   • Years of education: Not on  file   • Highest education level: Not on file   Social Needs   • Financial resource strain: Not on file   • Food insecurity - worry: Not on file   • Food insecurity - inability: Not on file   • Transportation needs - medical: Not on file   • Transportation needs - non-medical: Not on file   Occupational History   • Not on file   Tobacco Use   • Smoking status: Current Every Day Smoker     Packs/day: 1.00     Types: Cigarettes   • Smokeless tobacco: Never Used   • Tobacco comment: 2 PPD   Substance and Sexual Activity   • Alcohol use: Yes     Comment: OCCAS BEFORE PREG   • Drug use: No   • Sexual activity: Not Currently     Partners: Male   Other Topics Concern   • Not on file   Social History Narrative   • Not on file       Family History   Problem Relation Age of Onset   • Hypertension Mother    • Ovarian cancer Sister    • Hypertension Sister    • Vitamin D deficiency Sister        Review of Systems   Constitutional: Negative for appetite change, chills, diaphoresis, fatigue, fever and unexpected weight change.   HENT: Negative for nosebleeds, postnasal drip, sore throat, trouble swallowing and voice change.    Respiratory: Negative for cough, choking, chest tightness, shortness of breath and wheezing.    Cardiovascular: Negative for chest pain.   Gastrointestinal: Positive for abdominal distention, abdominal pain and diarrhea. Negative for anal bleeding, blood in stool, constipation, nausea, rectal pain and vomiting.   Endocrine: Negative for polydipsia, polyphagia and polyuria.   Musculoskeletal: Negative for gait problem.   Skin: Negative for rash and wound.   Allergic/Immunologic: Negative for food allergies.   Neurological: Negative for dizziness, speech difficulty and light-headedness.   Psychiatric/Behavioral: Negative for confusion, self-injury, sleep disturbance and suicidal ideas.       Vitals:    02/12/19 1317   BP: 128/86   Temp: 98.7 °F (37.1 °C)       Physical Exam   Constitutional: She is oriented  to person, place, and time. She appears well-developed and well-nourished. She does not appear ill. No distress.   HENT:   Head: Normocephalic.   Eyes: Pupils are equal, round, and reactive to light.   Cardiovascular: Normal rate, regular rhythm and normal heart sounds.   Pulmonary/Chest: Effort normal and breath sounds normal.   Abdominal: Soft. Bowel sounds are normal. She exhibits no distension and no mass. There is no hepatosplenomegaly. There is no tenderness. There is no rebound and no guarding. No hernia.   Musculoskeletal: Normal range of motion.   Neurological: She is alert and oriented to person, place, and time.   Skin: Skin is warm and dry.   Psychiatric: She has a normal mood and affect. Her speech is normal and behavior is normal. Judgment normal.       No images are attached to the encounter.    Assessment & plan     1. Functional abdominal pain syndrome  - desipramine (NORPRAMIN) 25 MG tablet; Take 1 tablet by mouth 2 (Two) Times a Day.  Dispense: 60 tablet; Refill: 11    Had a long discussion with her about the medication and how it needs to be handled. She cannot change the dosing or the frequency without causing possible side effects. Recommend she get back on it BID since it was beneficial to her. Recommend she follow up with her counselor as planned. Call the office in 1 week with update. Follow up with me in 3 months.

## 2019-03-04 ENCOUNTER — TELEPHONE (OUTPATIENT)
Dept: GASTROENTEROLOGY | Facility: CLINIC | Age: 34
End: 2019-03-04

## 2019-03-04 NOTE — TELEPHONE ENCOUNTER
Spoke with patient last Wednesday about paperwork we received and what it was for. Told her I would speak with Dinah about completing them and get back with her.    Called and left message today for patient to call me back.  Dinah would like to refer her back to a primary care or to her counselor to have forms completed.  She states we don't need to complete.

## 2019-03-21 ENCOUNTER — TELEPHONE (OUTPATIENT)
Dept: GASTROENTEROLOGY | Facility: CLINIC | Age: 34
End: 2019-03-21

## 2019-03-21 RX ORDER — HYOSCYAMINE SULFATE 0.125 MG
0.12 TABLET ORAL
Qty: 120 TABLET | Refills: 5 | Status: SHIPPED | OUTPATIENT
Start: 2019-03-21 | End: 2019-05-23

## 2019-03-21 NOTE — TELEPHONE ENCOUNTER
"Called pt back. Pt states the dicyclomine does not work; it has never really helped with the pain but she has continued to take it hoping it was a \"mind of matter\" deal but it is juts not helping. She went to her PMD today, and they started her on Lexapro for her anxiety. She is still taking the desipramine twice daily but she is still having the pain. Advised will update Dinah and call back with recs. She has never tried hyoscyamine before. She has an appt scheduled for Monday because she was not sure when she would hear back from us. She can keep appt or move it out if need be.   "

## 2019-03-21 NOTE — TELEPHONE ENCOUNTER
Patient called, advised as per Dinah's note. She verb understanding and is agreeable to cancelling her office appointment on 3/25 and to keep the appointment in May.

## 2019-03-21 NOTE — TELEPHONE ENCOUNTER
Contact: [phone]  ----- Message -----  From: Farrukh Fregoso  Sent: 3/21/2019  12:45 PM  To: [facility] [address] [place]  Subject: pt called                                        Pt is calling stating she is having abd pain & is asking for a call back to see what she can do

## 2019-03-21 NOTE — TELEPHONE ENCOUNTER
Can try changing the bentyl to levsin and see how she does. If it helps she can move her appt out a month. I will send the Levsin to her pharmacy. Thanks.

## 2019-04-23 ENCOUNTER — PRIOR AUTHORIZATION (OUTPATIENT)
Dept: GASTROENTEROLOGY | Facility: CLINIC | Age: 34
End: 2019-04-23

## 2019-04-25 RX ORDER — DICYCLOMINE HCL 20 MG
20 TABLET ORAL 3 TIMES DAILY PRN
Qty: 90 TABLET | Refills: 5 | Status: SHIPPED | OUTPATIENT
Start: 2019-04-25 | End: 2020-09-17

## 2019-04-29 ENCOUNTER — HOSPITAL ENCOUNTER (OUTPATIENT)
Dept: URGENT CARE | Facility: CLINIC | Age: 34
Discharge: HOME | End: 2019-04-29

## 2019-04-29 ENCOUNTER — TELEPHONE (OUTPATIENT)
Dept: GASTROENTEROLOGY | Facility: CLINIC | Age: 34
End: 2019-04-29

## 2019-04-29 NOTE — TELEPHONE ENCOUNTER
I do not feel comfortable increasing the dose. I would rather her follow up with Dr. Chapa to discuss medication increase. Thanks.

## 2019-04-29 NOTE — TELEPHONE ENCOUNTER
----- Message from Farrukh Fregoso sent at 4/29/2019  9:53 AM EDT -----  Regarding: pt called   Contact: [phone]  Pt is calling about her medication desipramine (NORPRAMIN) 25 MG tablet, asking if she can get a increase of this medication.

## 2019-04-29 NOTE — TELEPHONE ENCOUNTER
Returned patient's phone call. She states she would like to increase her dose of Desipramine. States the dose helps most days but the diarrhea continues, especially is she is in a stressful situation and/or is excited. Advised will send an update to DAVID Nix. She verb understanding and is in agreement with the plan.

## 2019-04-30 NOTE — TELEPHONE ENCOUNTER
Per Dr. Chapa: Have her do desipramine 25 mg p.o. twice daily for now and by IBgard to be taken on days that she is having diarrhea or stressful situation, also Imodium can be used for diarrhea, tell her that unfortunately increasing desipramine may cause side effects such as dizziness, if she tries IBgard and Imodium it does not help I am okay with going up on desipramine and she should let me know in 2 weeks, office visit with Ashley 4 weeks

## 2019-05-23 ENCOUNTER — OFFICE VISIT (OUTPATIENT)
Dept: GASTROENTEROLOGY | Facility: CLINIC | Age: 34
End: 2019-05-23

## 2019-05-23 VITALS
DIASTOLIC BLOOD PRESSURE: 88 MMHG | SYSTOLIC BLOOD PRESSURE: 120 MMHG | HEIGHT: 66 IN | WEIGHT: 156.4 LBS | BODY MASS INDEX: 25.13 KG/M2 | TEMPERATURE: 98.1 F

## 2019-05-23 DIAGNOSIS — R10.9 FUNCTIONAL ABDOMINAL PAIN SYNDROME: Primary | ICD-10-CM

## 2019-05-23 PROCEDURE — 99214 OFFICE O/P EST MOD 30 MIN: CPT | Performed by: NURSE PRACTITIONER

## 2019-05-23 NOTE — PROGRESS NOTES
Chief Complaint   Patient presents with   • Follow-up   • Abdominal Pain   • Diarrhea       Cristina Magana is a  33 y.o. female here for a follow up visit for abd pain.    HPI  32 yo f presents today for follow up visit for chronic functional abd pain. She is a patient of Dr. Chapa. She was last seen in the office on 19. She has hx functional abd pain and has had an extensive workup in the past. She has been doing well with desipramine 25 mg po BID. She admits she has been having a lot of stress and anxiety lately so she has had several episodes of worsening abd pain lately. She has not tried IBGARD. She has been taking bentyl but admits it doesn't help much. She was switched to Levsin but her insurance wouldn't cover it. She has been seeing a new mental provider and she wants the patient to start Lexapro but the patient is hesitant to take another medication. She has been taking hydroxyzine PRN and it seems to help her anxiety and helps her to sleep. But she continues to report issues with anxiety and depression. She does see a counselor routinely. She denies any dysphagia, reflux, N&V, diarrhea, constipation, rectal bleeding or melena. She admits her appetite is better and her weight is stable.       Past Medical History:   Diagnosis Date   • Abnormal Pap smear of cervix    • Gestational hypertension    • Ovarian cyst    • Vitamin D deficiency        Past Surgical History:   Procedure Laterality Date   •  SECTION     • COLONOSCOPY  2018    [facility]   • TONSILLECTOMY     • UPPER GASTROINTESTINAL ENDOSCOPY  2018    Normal       Scheduled Meds:    Continuous Infusions:  No current facility-administered medications for this visit.     PRN Meds:.    No Known Allergies    Social History     Socioeconomic History   • Marital status: Single     Spouse name: Not on file   • Number of children: Not on file   • Years of education: Not on file   • Highest education level: Not on file   Tobacco Use   •  Smoking status: Current Every Day Smoker     Packs/day: 1.00     Types: Cigarettes   • Smokeless tobacco: Never Used   • Tobacco comment: 2 PPD   Substance and Sexual Activity   • Alcohol use: No     Frequency: Never   • Drug use: No   • Sexual activity: Not Currently     Partners: Male       Family History   Problem Relation Age of Onset   • Hypertension Mother    • Ovarian cancer Sister    • Hypertension Sister    • Vitamin D deficiency Sister        Review of Systems   Constitutional: Negative for appetite change, chills, diaphoresis, fatigue, fever and unexpected weight change.   HENT: Negative for nosebleeds, postnasal drip, sore throat, trouble swallowing and voice change.    Respiratory: Negative for cough, choking, chest tightness, shortness of breath and wheezing.    Cardiovascular: Negative for chest pain, palpitations and leg swelling.   Gastrointestinal: Negative for abdominal distention, abdominal pain, anal bleeding, blood in stool, constipation, diarrhea, nausea, rectal pain and vomiting.   Endocrine: Negative for polydipsia, polyphagia and polyuria.   Musculoskeletal: Negative for gait problem.   Skin: Negative for rash and wound.   Allergic/Immunologic: Negative for food allergies.   Neurological: Negative for dizziness, speech difficulty and light-headedness.   Psychiatric/Behavioral: Negative for confusion, self-injury, sleep disturbance and suicidal ideas.       Vitals:    05/23/19 1125   BP: 120/88   Temp: 98.1 °F (36.7 °C)       Physical Exam   Constitutional: She is oriented to person, place, and time. She appears well-developed and well-nourished. She does not appear ill. No distress.   HENT:   Head: Normocephalic.   Eyes: Pupils are equal, round, and reactive to light.   Cardiovascular: Normal rate, regular rhythm and normal heart sounds.   Pulmonary/Chest: Effort normal and breath sounds normal.   Abdominal: Soft. Bowel sounds are normal. She exhibits no distension and no mass. There is no  hepatosplenomegaly. There is no tenderness. There is no rebound and no guarding. No hernia.   Musculoskeletal: Normal range of motion.   Neurological: She is alert and oriented to person, place, and time.   Skin: Skin is warm and dry.   Psychiatric: She has a normal mood and affect. Her speech is normal and behavior is normal. Judgment normal.       No images are attached to the encounter.    Assessment & Plan    1. Functional abdominal pain syndrome    She seems to be stable on the desipramine 25 mg BID. Recommend she tries some IBGARD OTC PRN for flare ups. Advised her to try the lexapro per her new mental health provider. Call office with any issues. Follow up with SUSAN Whaley in 1 month.

## 2019-06-25 ENCOUNTER — OFFICE VISIT (OUTPATIENT)
Dept: GASTROENTEROLOGY | Facility: CLINIC | Age: 34
End: 2019-06-25

## 2019-06-25 VITALS
DIASTOLIC BLOOD PRESSURE: 78 MMHG | WEIGHT: 158.2 LBS | HEIGHT: 66 IN | SYSTOLIC BLOOD PRESSURE: 124 MMHG | TEMPERATURE: 98.4 F | BODY MASS INDEX: 25.43 KG/M2

## 2019-06-25 DIAGNOSIS — R10.9 FUNCTIONAL ABDOMINAL PAIN SYNDROME: Primary | ICD-10-CM

## 2019-06-25 PROCEDURE — 99213 OFFICE O/P EST LOW 20 MIN: CPT | Performed by: NURSE PRACTITIONER

## 2019-06-25 RX ORDER — ESCITALOPRAM OXALATE 5 MG/1
10 TABLET ORAL DAILY
COMMUNITY
End: 2020-09-17

## 2019-06-25 NOTE — PROGRESS NOTES
Chief Complaint   Patient presents with   • Follow-up   • Abdominal Pain       Cristina Magana is a  34 y.o. female here for a follow up visit for abdominal pain.    This is an established patient of Dr. Chapa's, new to me.  She has a history of functional abdominal pain.  She was last seen in office in May at which time she been doing well on desipramine 25 mg p.o. twice daily.  IBgard PRN for flareups as recommended on last office visit.  She was also contemplating starting Lexapro with her mental health provider.    On visit today patient reports good and bad days usually associated with her anxiety and depression.  She continues to improve on desipramine 25 mg twice daily and occasional Bentyl.  Frequency of abdominal pain has greatly reduced as well as severity.  When pain does occur it is generalized and again mainly associated with her anxiety.  Patient is currently seeing a mental health provider and recently started on Lexapro.  She recently moved to [place] to be closer to family for support.    Bowels are moving daily with soft stools.  No diarrhea, constipation, rectal bleeding.    Reviewed prior work-up as below:    EGD 2018 -- Normal.     C/s 2018 --internal hemorrhoids otherwise normal.  Repeat colonoscopy in 10 years for screening purposes.    SBFT 2018 --no focal abnormality of caliber or mucosal contour of the mesenteric small bowel was seen.  Small bowel transit was brisk.  Past Medical History:   Diagnosis Date   • Abnormal Pap smear of cervix    • Gestational hypertension    • Ovarian cyst    • Vitamin D deficiency        Past Surgical History:   Procedure Laterality Date   •  SECTION     • COLONOSCOPY  2018    [facility]   • TONSILLECTOMY     • UPPER GASTROINTESTINAL ENDOSCOPY  2018    Normal       Scheduled Meds:  Outpatient Encounter Medications as of 2019   Medication Sig Dispense Refill   • desipramine (NORPRAMIN) 25 MG tablet Take 1 tablet by mouth 2 (Two) Times a Day. 60  tablet 11   • escitalopram (LEXAPRO) 5 MG tablet Take 5 mg by mouth Daily.     • medroxyPROGESTERone (DEPO-PROVERA) 150 MG/ML injection Inject 1 mL into the shoulder, thigh, or buttocks Every 3 (Three) Months. 1 mL 3   • spironolactone (ALDACTONE) 50 MG tablet Take 50 mg by mouth Daily.     • vitamin D (ERGOCALCIFEROL) 88401 UNITS capsule capsule TAKE ONE CAPSULE BY MOUTH ONCE WEEKLY     • dicyclomine (BENTYL) 20 MG tablet Take 1 tablet by mouth Every 6 (Six) Hours. 90 tablet 3   • dicyclomine (BENTYL) 20 MG tablet Take 1 tablet by mouth 3 (Three) Times a Day As Needed (abd pain). 90 tablet 5   • fenofibrate (TRICOR) 145 MG tablet TAKE ONE TABLET BY MOUTH DAILY     • ibuprofen (ADVIL,MOTRIN) 800 MG tablet Take 1 tablet by mouth Every 8 (Eight) Hours As Needed for mild pain (1-3). 30 tablet 2     No facility-administered encounter medications on file as of 6/25/2019.        Continuous Infusions:  No current facility-administered medications for this visit.     PRN Meds:.    No Known Allergies    Social History     Socioeconomic History   • Marital status: Single     Spouse name: Not on file   • Number of children: Not on file   • Years of education: Not on file   • Highest education level: Not on file   Tobacco Use   • Smoking status: Current Every Day Smoker     Packs/day: 1.00     Types: Cigarettes   • Smokeless tobacco: Never Used   • Tobacco comment: 2 PPD   Substance and Sexual Activity   • Alcohol use: No     Frequency: Never   • Drug use: No   • Sexual activity: Not Currently     Partners: Male       Family History   Problem Relation Age of Onset   • Hypertension Mother    • Ovarian cancer Sister    • Hypertension Sister    • Vitamin D deficiency Sister        Review of Systems   Constitutional: Negative for activity change, appetite change, fatigue, fever and unexpected weight change.   HENT: Negative for trouble swallowing.    Respiratory: Negative for apnea, cough, choking, chest tightness, shortness of  "breath and wheezing.    Cardiovascular: Negative for chest pain, palpitations and leg swelling.   Gastrointestinal: Positive for abdominal pain. Negative for abdominal distention, anal bleeding, blood in stool, constipation, diarrhea, nausea, rectal pain and vomiting.       Vitals:    06/25/19 1126   BP: 124/78   Temp: 98.4 °F (36.9 °C)       Physical Exam   Constitutional: She is oriented to person, place, and time. She appears well-developed and well-nourished.   Eyes: Pupils are equal, round, and reactive to light.   Cardiovascular: Normal rate, regular rhythm and normal heart sounds.   Pulmonary/Chest: Effort normal and breath sounds normal. No respiratory distress. She has no wheezes.   Abdominal: Soft. Bowel sounds are normal. She exhibits no distension and no mass. There is no tenderness. There is no guarding. No hernia.   Musculoskeletal: Normal range of motion.   Neurological: She is alert and oriented to person, place, and time.   Skin: Skin is warm and dry. Capillary refill takes less than 2 seconds.   Psychiatric: She has a normal mood and affect. Her behavior is normal.       No images are attached to the encounter.    Cristina was seen today for follow-up and abdominal pain.    Diagnoses and all orders for this visit:    Functional abdominal pain syndrome    Impression:    This is a 34-year-old female with a history of functional abdominal pain syndrome seen today in follow-up.  She still struggling with anxiety but fortunately was able to establish care with a mental health provider and recently started Lexapro.  Patient feels like her abdominal pain is linked to anxiety and depression.  She states that desipramine \"saved her life.\"  She still has good and bad days but overall continues to improve.  At this point continue current GI medications and see her back in 3 months.  Patient is agreeable to plan of care.            "

## 2019-07-23 ENCOUNTER — HOSPITAL ENCOUNTER (OUTPATIENT)
Dept: URGENT CARE | Facility: CLINIC | Age: 34
Discharge: HOME | End: 2019-07-23
Attending: EMERGENCY MEDICINE

## 2019-09-16 ENCOUNTER — OFFICE VISIT (OUTPATIENT)
Dept: OBSTETRICS AND GYNECOLOGY | Facility: CLINIC | Age: 34
End: 2019-09-16

## 2019-09-16 VITALS
HEIGHT: 66 IN | SYSTOLIC BLOOD PRESSURE: 140 MMHG | WEIGHT: 158 LBS | BODY MASS INDEX: 25.39 KG/M2 | DIASTOLIC BLOOD PRESSURE: 70 MMHG

## 2019-09-16 DIAGNOSIS — N91.2 AMENORRHEA: ICD-10-CM

## 2019-09-16 DIAGNOSIS — Z11.3 SCREENING FOR VENEREAL DISEASE: ICD-10-CM

## 2019-09-16 DIAGNOSIS — Z01.419 ENCOUNTER FOR GYNECOLOGICAL EXAMINATION WITHOUT ABNORMAL FINDING: Primary | ICD-10-CM

## 2019-09-16 PROCEDURE — 99395 PREV VISIT EST AGE 18-39: CPT | Performed by: OBSTETRICS & GYNECOLOGY

## 2019-09-16 RX ORDER — MEDROXYPROGESTERONE ACETATE 150 MG/ML
1 INJECTION, SUSPENSION INTRAMUSCULAR
Qty: 1 ML | Refills: 3
Start: 2019-09-16 | End: 2020-09-17

## 2019-09-16 RX ORDER — HYDROCODONE BITARTRATE AND ACETAMINOPHEN 7.5; 325 MG/1; MG/1
TABLET ORAL
COMMUNITY
Start: 2019-09-03 | End: 2021-07-20

## 2019-09-16 NOTE — PROGRESS NOTES
Subjective    Chief Complaint   Patient presents with   • Gynecologic Exam     AE, PT STATES POSS PREGNANCY       History of Present Illness    Cristina Magana is a 34 y.o. female who presents for annual exam.  Patient is a smoker and has Chantix at home by her PCP which she is getting ready to start if she is not pregnant.  She has been off Depo-Provera at least 4 months and would like a pregnancy test performed.  She also would like STD screening.  No other problems.    Obstetric History:  OB History      Para Term  AB Living    3 1 1   1 2    SAB TAB Ectopic Molar Multiple Live Births    1                   Menstrual History:     No LMP recorded.       Past Medical History:   Diagnosis Date   • Abnormal Pap smear of cervix    • Gestational hypertension    • Ovarian cyst    • Vitamin D deficiency      Family History   Problem Relation Age of Onset   • Hypertension Mother    • Ovarian cancer Sister    • Hypertension Sister    • Vitamin D deficiency Sister      Social History     Tobacco Use   Smoking Status Current Every Day Smoker   • Packs/day: 1.00   • Types: Cigarettes   Smokeless Tobacco Never Used   Tobacco Comment    2 PPD     [unfilled]    The following portions of the patient's history were reviewed and updated as appropriate: allergies, current medications, past family history, past medical history, past social history, past surgical history and problem list.    Review of Systems   Constitutional: Negative.  Negative for fever and unexpected weight change.   HENT: Negative.    Respiratory: Negative for shortness of breath and wheezing.    Cardiovascular: Negative for chest pain, palpitations and leg swelling.   Gastrointestinal: Negative for abdominal pain, anal bleeding and blood in stool.   Genitourinary: Negative for dysuria, pelvic pain, urgency, vaginal bleeding, vaginal discharge and vaginal pain.   Skin: Negative.    Neurological: Negative.    Hematological: Negative.  Negative for  "adenopathy.   Psychiatric/Behavioral: Negative.  Negative for dysphoric mood. The patient is not nervous/anxious.             Objective   Physical Exam   Constitutional: She is oriented to person, place, and time. Vital signs are normal. She appears well-developed and well-nourished.   HENT:   Head: Normocephalic.   Neck: Trachea normal. No tracheal deviation present. No thyromegaly present.   Cardiovascular: Normal rate, regular rhythm and normal heart sounds.   No murmur heard.  Pulmonary/Chest: Effort normal and breath sounds normal.   Breasts without masses, tenderness or nipple discharge   Abdominal: Soft. Normal appearance. She exhibits no mass. There is no hepatosplenomegaly. There is no tenderness. No hernia.   Genitourinary: Rectum normal, vagina normal and uterus normal. Uterus is not enlarged and not tender. Cervix exhibits no motion tenderness. Right adnexum displays no mass and no tenderness. Left adnexum displays no mass and no tenderness. No vaginal discharge found.   Genitourinary Comments: External genitalia normal    Lymphadenopathy:     She has no cervical adenopathy.     She has no axillary adenopathy.   Neurological: She is alert and oriented to person, place, and time.   Skin: Skin is warm and dry. No rash noted.   Psychiatric: She has a normal mood and affect. Her behavior is normal. Cognition and memory are normal.       /70   Ht 166.4 cm (65.5\")   Wt 71.7 kg (158 lb)   BMI 25.89 kg/m²     Assessment/Plan   Cristina was seen today for gynecologic exam.    Diagnoses and all orders for this visit:    Encounter for gynecological examination without abnormal finding  -     IGP,rfx Aptima HPV All Pth    Amenorrhea  -     HCG, B-subunit, Quantitative    Screening for venereal disease  -     HCV Antibody With / Rflx To Verification  -     Hepatitis B Surface Antigen  -     HIV-1 / O / 2 Ag / Antibody 4th Generation  -     RPR    Other orders  -     medroxyPROGESTERone Acetate (DEPO-PROVERA) " 150 MG/ML suspension prefilled syringe; Inject 1 mL into the appropriate muscle as directed by prescriber Every 3 (Three) Months.        Return to office 1 year.    Counseled about screening for venereal disease.  Also discussed smoking cessation.

## 2019-09-17 LAB
HBV SURFACE AG SERPL QL IA: NEGATIVE
HCG INTACT+B SERPL-ACNC: <0.5 MIU/ML
HCV AB S/CO SERPL IA: <0.1 S/CO RATIO (ref 0–0.9)
HIV 1+2 AB+HIV1 P24 AG SERPL QL IA: NON REACTIVE
LABORATORY COMMENT REPORT: NORMAL
RPR SER QL: NORMAL

## 2019-09-18 LAB
A VAGINAE DNA VAG QL NAA+PROBE: ABNORMAL SCORE
BVAB2 DNA VAG QL NAA+PROBE: ABNORMAL SCORE
C ALBICANS DNA VAG QL NAA+PROBE: NEGATIVE
C GLABRATA DNA VAG QL NAA+PROBE: NEGATIVE
C TRACH RRNA SPEC QL NAA+PROBE: NEGATIVE
CONV .: NORMAL
CYTOLOGIST CVX/VAG CYTO: NORMAL
CYTOLOGY CVX/VAG DOC CYTO: NORMAL
CYTOLOGY CVX/VAG DOC THIN PREP: NORMAL
DX ICD CODE: NORMAL
HIV 1 & 2 AB SER-IMP: NORMAL
MEGA1 DNA VAG QL NAA+PROBE: ABNORMAL SCORE
N GONORRHOEA RRNA SPEC QL NAA+PROBE: NEGATIVE
OTHER STN SPEC: NORMAL
STAT OF ADQ CVX/VAG CYTO-IMP: NORMAL
T VAGINALIS RRNA SPEC QL NAA+PROBE: NEGATIVE

## 2019-09-19 ENCOUNTER — TELEPHONE (OUTPATIENT)
Dept: OBSTETRICS AND GYNECOLOGY | Facility: CLINIC | Age: 34
End: 2019-09-19

## 2019-09-19 RX ORDER — METRONIDAZOLE 500 MG/1
500 TABLET ORAL 3 TIMES DAILY
Qty: 15 TABLET | Refills: 0 | Status: SHIPPED | OUTPATIENT
Start: 2019-09-19 | End: 2019-09-24

## 2019-09-19 NOTE — TELEPHONE ENCOUNTER
Please tell patient all of her labs including her STD checks and her pregnancy test were negative except for having some mild bacterial vaginosis.  I have sent Flagyl to her pharmacy for 5 days that should take care of that but she cannot have alcohol with it.  NICHELLE

## 2019-10-07 DIAGNOSIS — R10.84 GENERALIZED ABDOMINAL PAIN: ICD-10-CM

## 2019-10-07 RX ORDER — DESIPRAMINE HYDROCHLORIDE 25 MG/1
TABLET ORAL
Qty: 60 TABLET | Refills: 3 | OUTPATIENT
Start: 2019-10-07

## 2019-10-07 NOTE — TELEPHONE ENCOUNTER
----- Message from Yuly Plascencia RN sent at 10/7/2019  4:00 PM EDT -----  Regarding: FW: refill   Contact: [phone]      ----- Message -----  From: Barby Kelley  Sent: 10/7/2019   3:43 PM  To: [facility] [place]  Subject: refill                                           Pt is calling for a refill of desipramine (NORPRAMIN) 25 MG tablet, pt only has 3-4 days of medication     Pharmacy:  [facility] Madina Saavedra [phone]

## 2019-10-07 NOTE — TELEPHONE ENCOUNTER
Called [facility] pharmacy and advised they have a prescription for the patient's Desipramine. Spoke with Ganesh who confirms the prescription and to ignore the refill request.

## 2019-11-08 ENCOUNTER — OFFICE VISIT (OUTPATIENT)
Dept: GASTROENTEROLOGY | Facility: CLINIC | Age: 34
End: 2019-11-08

## 2019-11-08 VITALS
BODY MASS INDEX: 26.16 KG/M2 | TEMPERATURE: 98.8 F | WEIGHT: 162.8 LBS | HEIGHT: 66 IN | SYSTOLIC BLOOD PRESSURE: 114 MMHG | DIASTOLIC BLOOD PRESSURE: 76 MMHG

## 2019-11-08 DIAGNOSIS — R10.84 GENERALIZED ABDOMINAL PAIN: Primary | ICD-10-CM

## 2019-11-08 DIAGNOSIS — R10.30 LOWER ABDOMINAL PAIN: ICD-10-CM

## 2019-11-08 DIAGNOSIS — R10.9 ABDOMINAL PAIN, UNSPECIFIED ABDOMINAL LOCATION: ICD-10-CM

## 2019-11-08 DIAGNOSIS — R10.9 ABDOMINAL CRAMPS: ICD-10-CM

## 2019-11-08 PROCEDURE — 99213 OFFICE O/P EST LOW 20 MIN: CPT | Performed by: INTERNAL MEDICINE

## 2019-11-08 RX ORDER — DESIPRAMINE HYDROCHLORIDE 25 MG/1
50 TABLET ORAL 2 TIMES DAILY
Qty: 60 TABLET | Refills: 11 | Status: SHIPPED | OUTPATIENT
Start: 2019-11-08 | End: 2020-02-04

## 2019-11-08 NOTE — PROGRESS NOTES
Chief Complaint   Patient presents with   • Abdominal Pain       Crsitina Magana is a  34 y.o. female here for a follow up visit for abdominal pain    34-year-old with functional abdominal pain syndrome with normal CT, colonoscopy and EGD last year    Pain is partially controlled with desipramine 25 mg p.o. twice daily.  She has recently started Lexapro and tells me her anxiety stress and mood are much improved    She has no alarm symptoms such as rectal bleeding or weight loss that would require repeat imaging or scope        Past Medical History:   Diagnosis Date   • Abnormal Pap smear of cervix    • Gestational hypertension    • Ovarian cyst    • Vitamin D deficiency        Past Surgical History:   Procedure Laterality Date   •  SECTION     • COLONOSCOPY  2018    [facility]   • TONSILLECTOMY     • UPPER GASTROINTESTINAL ENDOSCOPY  2018    Normal       Scheduled Meds:    Continuous Infusions:  No current facility-administered medications for this visit.     PRN Meds:.    No Known Allergies    Social History     Socioeconomic History   • Marital status: Single     Spouse name: Not on file   • Number of children: Not on file   • Years of education: Not on file   • Highest education level: Not on file   Tobacco Use   • Smoking status: Current Every Day Smoker     Packs/day: 1.00     Types: Cigarettes   • Smokeless tobacco: Never Used   • Tobacco comment: 2 PPD   Substance and Sexual Activity   • Alcohol use: No     Frequency: Never   • Drug use: No   • Sexual activity: Not Currently     Partners: Male       Family History   Problem Relation Age of Onset   • Hypertension Mother    • Ovarian cancer Sister    • Hypertension Sister    • Vitamin D deficiency Sister        Review of Systems   Gastrointestinal: Positive for abdominal distention and abdominal pain.   All other systems reviewed and are negative.      Vitals:    19 0911   BP: 114/76   Temp: 98.8 °F (37.1 °C)       Physical Exam   Constitutional:  She is oriented to person, place, and time. She appears well-developed and well-nourished.   HENT:   Head: Normocephalic and atraumatic.   Eyes: Pupils are equal, round, and reactive to light.   Cardiovascular: Normal rate, regular rhythm and normal heart sounds.   Pulmonary/Chest: Effort normal and breath sounds normal.   Abdominal: Soft. Bowel sounds are normal. She exhibits no shifting dullness, no distension, no pulsatile liver, no fluid wave, no abdominal bruit, no ascites, no pulsatile midline mass and no mass. There is no hepatosplenomegaly. There is no tenderness. There is no rigidity and no guarding. No hernia.   Musculoskeletal: Normal range of motion.   Neurological: She is alert and oriented to person, place, and time.   Skin: Skin is warm and dry.   Psychiatric: She has a normal mood and affect. Her behavior is normal. Thought content normal.   Nursing note and vitals reviewed.      No images are attached to the encounter.    Problem list    Functional abdominal pain syndrome    Assessment/Plan    Colonoscopy 2021  Increase desipramine to 50 twice daily  Continue Lexapro  Continue Bentyl as needed  Office visit 6 months

## 2019-11-11 ENCOUNTER — OFFICE VISIT CONVERTED (OUTPATIENT)
Dept: NEUROSURGERY | Facility: CLINIC | Age: 34
End: 2019-11-11
Attending: NEUROLOGICAL SURGERY

## 2019-11-17 ENCOUNTER — HOSPITAL ENCOUNTER (OUTPATIENT)
Dept: URGENT CARE | Facility: CLINIC | Age: 34
Discharge: HOME | End: 2019-11-17
Attending: NURSE PRACTITIONER

## 2020-02-04 ENCOUNTER — OFFICE VISIT (OUTPATIENT)
Dept: OBSTETRICS AND GYNECOLOGY | Facility: CLINIC | Age: 35
End: 2020-02-04

## 2020-02-04 VITALS
SYSTOLIC BLOOD PRESSURE: 118 MMHG | HEIGHT: 66 IN | BODY MASS INDEX: 26.03 KG/M2 | WEIGHT: 162 LBS | DIASTOLIC BLOOD PRESSURE: 76 MMHG

## 2020-02-04 DIAGNOSIS — N89.8 VAGINAL DISCHARGE: Primary | ICD-10-CM

## 2020-02-04 PROCEDURE — 99213 OFFICE O/P EST LOW 20 MIN: CPT | Performed by: OBSTETRICS & GYNECOLOGY

## 2020-02-04 RX ORDER — ESCITALOPRAM OXALATE 10 MG/1
TABLET ORAL
COMMUNITY
End: 2021-07-15

## 2020-02-04 RX ORDER — TIZANIDINE 4 MG/1
TABLET ORAL EVERY 12 HOURS
COMMUNITY

## 2020-02-04 RX ORDER — DESIPRAMINE HYDROCHLORIDE 50 MG/1
TABLET ORAL
COMMUNITY
End: 2021-03-12

## 2020-02-04 RX ORDER — GABAPENTIN 300 MG/1
CAPSULE ORAL
COMMUNITY
End: 2020-09-17

## 2020-02-04 NOTE — PROGRESS NOTES
"Subjective  CC  Vaginal discharge   History of Present Illness    Cristina Magana is a 34 y.o. female who presents for evaluation of mild vaginal discharge following getting back together with her .  She would like STD check as a precaution as well.  Just minimal irritation noted.    Obstetric History:  OB History        3    Para   1    Term   1            AB   1    Living   2       SAB   1    TAB        Ectopic        Molar        Multiple        Live Births                   Menstrual History:     No LMP recorded.       Past Medical History:   Diagnosis Date   • Abnormal Pap smear of cervix    • Gestational hypertension    • Ovarian cyst    • Vitamin D deficiency      Family History   Problem Relation Age of Onset   • Hypertension Mother    • Ovarian cancer Sister    • Hypertension Sister    • Vitamin D deficiency Sister        The following portions of the patient's history were reviewed and updated as appropriate: allergies, current medications, past family history, past medical history, past social history and problem list.    Review of Systems  Positive for mild vaginal discharge and irritation.       Objective   Physical Exam  Vagina demonstrates minimal white discharge.  Vaginal culture performed.  Cervix without lesion.  Bimanual exam negative.  /76   Ht 166.4 cm (65.5\")   Wt 73.5 kg (162 lb)   BMI 26.55 kg/m²     Assessment/Plan   Diagnoses and all orders for this visit:    Vaginal discharge  -     NuSwab VG+ - Swab, Vagina        Patient to call next week for results of culture.  Due to history of BV in the past, discussed beginning probiotics for vaginal health.               "

## 2020-02-06 LAB
A VAGINAE DNA VAG QL NAA+PROBE: ABNORMAL SCORE
BVAB2 DNA VAG QL NAA+PROBE: ABNORMAL SCORE
C ALBICANS DNA VAG QL NAA+PROBE: NEGATIVE
C GLABRATA DNA VAG QL NAA+PROBE: NEGATIVE
C TRACH RRNA SPEC QL NAA+PROBE: NEGATIVE
MEGA1 DNA VAG QL NAA+PROBE: ABNORMAL SCORE
N GONORRHOEA RRNA SPEC QL NAA+PROBE: NEGATIVE
T VAGINALIS RRNA SPEC QL NAA+PROBE: NEGATIVE

## 2020-02-07 ENCOUNTER — TELEPHONE (OUTPATIENT)
Dept: OBSTETRICS AND GYNECOLOGY | Facility: CLINIC | Age: 35
End: 2020-02-07

## 2020-02-07 RX ORDER — METRONIDAZOLE 500 MG/1
500 TABLET ORAL 2 TIMES DAILY
Qty: 14 TABLET | Refills: 0 | Status: SHIPPED | OUTPATIENT
Start: 2020-02-07 | End: 2020-02-14

## 2020-02-07 NOTE — TELEPHONE ENCOUNTER
----- Message from Lizeth Shen MD sent at 2/7/2020  2:47 PM EST -----  Penta patient.  Let patient know that her vaginal culture was positive for bacterial vaginosis and prescription has been sent.

## 2020-02-12 ENCOUNTER — TELEPHONE (OUTPATIENT)
Dept: OBSTETRICS AND GYNECOLOGY | Facility: CLINIC | Age: 35
End: 2020-02-12

## 2020-02-12 NOTE — TELEPHONE ENCOUNTER
Patient called said she seen you on 02/04 and spoke about getting a note for work/school for 02/05 due to the issues she had going on. Would it be okay to provide her with the note?

## 2020-09-17 ENCOUNTER — OFFICE VISIT (OUTPATIENT)
Dept: OBSTETRICS AND GYNECOLOGY | Facility: CLINIC | Age: 35
End: 2020-09-17

## 2020-09-17 VITALS
BODY MASS INDEX: 26.84 KG/M2 | WEIGHT: 167 LBS | SYSTOLIC BLOOD PRESSURE: 130 MMHG | HEIGHT: 66 IN | DIASTOLIC BLOOD PRESSURE: 82 MMHG

## 2020-09-17 DIAGNOSIS — Z01.419 ENCOUNTER FOR GYNECOLOGICAL EXAMINATION WITHOUT ABNORMAL FINDING: Primary | ICD-10-CM

## 2020-09-17 DIAGNOSIS — Z30.9 ENCOUNTER FOR CONTRACEPTIVE MANAGEMENT, UNSPECIFIED TYPE: ICD-10-CM

## 2020-09-17 DIAGNOSIS — Z11.3 SCREENING FOR STD (SEXUALLY TRANSMITTED DISEASE): ICD-10-CM

## 2020-09-17 LAB
B-HCG UR QL: NEGATIVE
INTERNAL NEGATIVE CONTROL: NEGATIVE
INTERNAL POSITIVE CONTROL: POSITIVE
Lab: NORMAL

## 2020-09-17 PROCEDURE — 99385 PREV VISIT NEW AGE 18-39: CPT | Performed by: NURSE PRACTITIONER

## 2020-09-17 RX ORDER — ISOTRETINOIN 40 MG/1
CAPSULE, LIQUID FILLED ORAL
COMMUNITY
Start: 2020-08-12 | End: 2021-07-20

## 2020-09-17 RX ORDER — MEDROXYPROGESTERONE ACETATE 150 MG/ML
150 INJECTION, SUSPENSION INTRAMUSCULAR
Qty: 1 ML | Refills: 3
Start: 2020-09-17 | End: 2021-07-15

## 2020-09-17 RX ORDER — HYDROXYZINE HYDROCHLORIDE 25 MG/1
TABLET, FILM COATED ORAL
COMMUNITY
Start: 2020-08-31 | End: 2022-05-09

## 2020-09-17 RX ORDER — GABAPENTIN 100 MG/1
CAPSULE ORAL
COMMUNITY
Start: 2020-08-27 | End: 2021-07-20

## 2020-09-17 RX ORDER — FENOFIBRATE 48 MG/1
TABLET, COATED ORAL
COMMUNITY
Start: 2020-09-05 | End: 2021-07-20

## 2020-09-17 NOTE — PROGRESS NOTES
GYN Annual Exam     Chief Complaint   Patient presents with   • Gynecologic Exam     AE       HPI    Cristina Magana is a 35 y.o. female who presents for annual well woman exam.  She is sexually active. Currently using depoprovera for contraception. She is happy with her contraception: Yes. Periods are absent.  No intermenstrual bleeding, spotting, or discharge. Performing SBE:does not complete. Completes depo at PCP office, last injection 2020.    This is my first time meeting Cristina Magana    OB History        3    Para   1    Term   1            AB   1    Living   2       SAB   1    TAB        Ectopic        Molar        Multiple        Live Births                    LMP-absent  Last Pap-  History of abnormal Pap smear: yes - f/u WNL  History of STD-gonorrhea and trichomonas   Family history of uterine, colon or ovarian cancer: no  Family history of breast cancer: no  Mammogram-not indicated  History of abnormal mammogram: no      Past Medical History:   Diagnosis Date   • Abnormal Pap smear of cervix    • Gestational hypertension    • Ovarian cyst    • Vitamin D deficiency        Past Surgical History:   Procedure Laterality Date   •  SECTION     • COLONOSCOPY  2018    [facility]   • TONSILLECTOMY     • UPPER GASTROINTESTINAL ENDOSCOPY  2018    Normal         Current Outpatient Medications:   •  Chantix Starting Month Corona 0.5 MG X 11 & 1 MG X 42 tablet, , Disp: , Rfl:   •  Claravis 40 MG capsule, , Disp: , Rfl:   •  desipramine (NORPRAMIN) 50 MG tablet, desipramine 50 mg tablet, Disp: , Rfl:   •  Ergocalciferol (VITAMIN D2 PO), ergocalciferol (vitamin D2) 1,250 mcg (50,000 unit) capsule, Disp: , Rfl:   •  escitalopram (LEXAPRO) 10 MG tablet, escitalopram 10 mg tablet, Disp: , Rfl:   •  fenofibrate (TRICOR) 48 MG tablet, , Disp: , Rfl:   •  gabapentin (NEURONTIN) 100 MG capsule, , Disp: , Rfl:   •  HYDROcodone-acetaminophen (NORCO) 7.5-325 MG per tablet, , Disp: , Rfl:   •   "hydrOXYzine (ATARAX) 25 MG tablet, , Disp: , Rfl:   •  medroxyPROGESTERone (DEPO-PROVERA) 150 MG/ML injection, Inject 1 mL into the shoulder, thigh, or buttocks Every 3 (Three) Months., Disp: 1 mL, Rfl: 3  •  tiZANidine (ZANAFLEX) 4 MG tablet, Every 12 (Twelve) Hours., Disp: , Rfl:     No Known Allergies    Social History     Tobacco Use   • Smoking status: Current Every Day Smoker     Packs/day: 1.00     Types: Cigarettes   • Smokeless tobacco: Never Used   • Tobacco comment: 2 PPD   Substance Use Topics   • Alcohol use: No     Frequency: Never   • Drug use: No       Family History   Problem Relation Age of Onset   • Hypertension Mother    • Ovarian cancer Sister    • Hypertension Sister    • Vitamin D deficiency Sister        Review of Systems   Constitutional: Negative for chills, fatigue and fever.   Gastrointestinal: Negative for abdominal distention, abdominal pain, constipation, diarrhea, nausea and vomiting.   Genitourinary: Negative for breast discharge, breast lump, breast pain, dyspareunia, dysuria, frequency, menstrual problem, pelvic pain, pelvic pressure, vaginal bleeding, vaginal discharge and vaginal pain.   Musculoskeletal: Negative for gait problem.   Neurological: Negative for dizziness and headache.   Psychiatric/Behavioral: Negative for depressed mood.       /82   Ht 166.4 cm (65.5\")   Wt 75.8 kg (167 lb)   LMP  (LMP Unknown)   BMI 27.37 kg/m²     Physical Exam  Vitals signs and nursing note reviewed.   Constitutional:       Appearance: Normal appearance.   HENT:      Head: Normocephalic and atraumatic.   Eyes:      Pupils: Pupils are equal, round, and reactive to light.   Neck:      Musculoskeletal: Normal range of motion and neck supple. No muscular tenderness.   Cardiovascular:      Rate and Rhythm: Normal rate.   Pulmonary:      Effort: Pulmonary effort is normal. No respiratory distress.   Chest:      Breasts: Breasts are symmetrical.         Right: No swelling, bleeding, inverted " nipple, mass, nipple discharge, skin change or tenderness.         Left: No swelling, bleeding, inverted nipple, mass, nipple discharge, skin change or tenderness.   Abdominal:      General: There is no distension.      Palpations: Abdomen is soft. There is no mass.      Tenderness: There is no abdominal tenderness. There is no guarding.      Hernia: No hernia is present. There is no hernia in the left inguinal area or right inguinal area.   Genitourinary:     Pubic Area: No rash or pubic lice.       Labia:         Right: No rash, tenderness, lesion or injury.         Left: No rash, tenderness, lesion or injury.       Urethra: No prolapse, urethral pain, urethral swelling or urethral lesion.      Vagina: No signs of injury and foreign body. No vaginal discharge, erythema, tenderness, bleeding, lesions or prolapsed vaginal walls.      Cervix: No cervical motion tenderness, discharge, friability, lesion, erythema, cervical bleeding or eversion.      Uterus: Normal. Not deviated, not enlarged and not tender.       Adnexa: Right adnexa normal and left adnexa normal.        Right: No mass, tenderness or fullness.          Left: No mass, tenderness or fullness.     Musculoskeletal: Normal range of motion.         General: No swelling.   Lymphadenopathy:      Cervical: No cervical adenopathy.      Upper Body:      Right upper body: No supraclavicular, axillary or pectoral adenopathy.      Left upper body: No supraclavicular, axillary or pectoral adenopathy.      Lower Body: No right inguinal adenopathy. No left inguinal adenopathy.   Skin:     General: Skin is warm and dry.      Coloration: Skin is not jaundiced or pale.      Findings: No bruising, erythema, lesion or rash.   Neurological:      Mental Status: She is alert and oriented to person, place, and time.      Cranial Nerves: No cranial nerve deficit.   Psychiatric:         Mood and Affect: Mood normal.         Behavior: Behavior normal.         Thought Content:  Thought content normal.         Judgment: Judgment normal.            Assessment     1. Annual Gyn Exam  2. Contraception management  3. Screening for STD    Plan   1. Well woman exam: Pap collected Yes. Recommend MVI daily.    2. Contraception: Refill for depoporvera  3. STD: Enc condoms. Desires STD screen today- Yes. Serum and NuSwab  Smoking status: Cristina Magana  reports that she has been smoking cigarettes. She has been smoking about 1.00 pack per day. She has never used smokeless tobacco.. I have educated her on the risk of diseases from using tobacco products such as cancer, COPD and heart diease. I advised her to quit and she is not willing to quit.I spent 3  minutes counseling the patient.  5. Patient's Body mass index is 27.37 kg/m². BMI is above normal parameters. Recommendations include: exercise counseling and nutrition counseling.  6.    Encouraged annual mammogram screening starting at age 40. Instructed on how to perform SBE. Encouraged breast health self awareness.  7.    Encouraged 150 minutes of exercise per week if not medially contraindicated.     Follow Up one year or PRN    Amelia Mccall, APRN  9/17/2020  14:00 EDT

## 2020-09-18 LAB
HAV IGM SERPL QL IA: NEGATIVE
HBV CORE IGM SERPL QL IA: NEGATIVE
HBV SURFACE AG SERPL QL IA: NEGATIVE
HCV AB S/CO SERPL IA: <0.1 S/CO RATIO (ref 0–0.9)
HIV 1+2 AB+HIV1 P24 AG SERPL QL IA: NON REACTIVE

## 2020-09-19 LAB — RPR SER QL: NORMAL

## 2020-09-22 ENCOUNTER — TELEPHONE (OUTPATIENT)
Dept: OBSTETRICS AND GYNECOLOGY | Facility: CLINIC | Age: 35
End: 2020-09-22

## 2020-09-22 LAB
CONV .: NORMAL
CYTOLOGIST CVX/VAG CYTO: NORMAL
CYTOLOGY CVX/VAG DOC CYTO: NORMAL
CYTOLOGY CVX/VAG DOC THIN PREP: NORMAL
DX ICD CODE: NORMAL
HIV 1 & 2 AB SER-IMP: NORMAL
Lab: NORMAL
OTHER STN SPEC: NORMAL
STAT OF ADQ CVX/VAG CYTO-IMP: NORMAL

## 2020-09-22 NOTE — TELEPHONE ENCOUNTER
----- Message from SUSAN Agustin sent at 9/21/2020  9:52 AM EDT -----  Please let the pt know that blood work was negative, vaginal cultures are still pending. Thanks

## 2020-09-23 ENCOUNTER — TELEPHONE (OUTPATIENT)
Dept: OBSTETRICS AND GYNECOLOGY | Facility: CLINIC | Age: 35
End: 2020-09-23

## 2020-09-23 LAB
A VAGINAE DNA VAG QL NAA+PROBE: ABNORMAL SCORE
BVAB2 DNA VAG QL NAA+PROBE: ABNORMAL SCORE
C ALBICANS DNA VAG QL NAA+PROBE: NEGATIVE
C GLABRATA DNA VAG QL NAA+PROBE: NEGATIVE
C TRACH DNA VAG QL NAA+PROBE: NEGATIVE
MEGA1 DNA VAG QL NAA+PROBE: ABNORMAL SCORE
N GONORRHOEA DNA VAG QL NAA+PROBE: NEGATIVE
T VAGINALIS DNA VAG QL NAA+PROBE: NEGATIVE

## 2020-09-23 RX ORDER — TINIDAZOLE 500 MG/1
1000 TABLET ORAL DAILY
Qty: 10 TABLET | Refills: 0 | Status: SHIPPED | OUTPATIENT
Start: 2020-09-23 | End: 2020-09-28

## 2020-09-23 NOTE — TELEPHONE ENCOUNTER
----- Message from SUSAN Agustin sent at 9/23/2020  9:04 AM EDT -----  Please let the pt know that cultures returned + BV. I have sent a different medication to her pharmacy than she has used in the past. If this does not improve her symptoms will may need to do a long term course of antibiotics. Thanks

## 2020-11-16 RX ORDER — DESIPRAMINE HYDROCHLORIDE 50 MG/1
TABLET ORAL
Qty: 60 TABLET | Refills: 10 | OUTPATIENT
Start: 2020-11-16

## 2020-11-23 ENCOUNTER — TELEPHONE (OUTPATIENT)
Dept: GASTROENTEROLOGY | Facility: CLINIC | Age: 35
End: 2020-11-23

## 2020-11-23 NOTE — TELEPHONE ENCOUNTER
Can we clarify dosage and frequency.  The computer says 50 mg of desipramine once daily but not sure how accurate that is.

## 2020-11-23 NOTE — TELEPHONE ENCOUNTER
----- Message from Mone Morrissey Rep sent at 11/23/2020  3:14 PM EST -----  Regarding: Refill Request  Contact: [phone]  Pt mixed up her appointment time with her sisters and unfortunately had to reschedule, would like to know if she can get a one month supply of Desipramine to last till new appointment on 12/30.     Pharmacy:    [facility] [address] [place] [address] DRIVE AT [facility] [address] - Phone: [phone]  Fax: [phone]

## 2021-01-28 ENCOUNTER — TELEPHONE (OUTPATIENT)
Dept: GASTROENTEROLOGY | Facility: CLINIC | Age: 36
End: 2021-01-28

## 2021-03-12 RX ORDER — DESIPRAMINE HYDROCHLORIDE 50 MG/1
TABLET ORAL
Qty: 60 TABLET | Refills: 10 | Status: SHIPPED | OUTPATIENT
Start: 2021-03-12 | End: 2021-07-15

## 2021-05-15 VITALS
HEIGHT: 66 IN | SYSTOLIC BLOOD PRESSURE: 136 MMHG | DIASTOLIC BLOOD PRESSURE: 86 MMHG | BODY MASS INDEX: 26.69 KG/M2 | WEIGHT: 166.06 LBS

## 2021-07-20 ENCOUNTER — OFFICE VISIT (OUTPATIENT)
Dept: OBSTETRICS AND GYNECOLOGY | Facility: CLINIC | Age: 36
End: 2021-07-20

## 2021-07-20 VITALS
WEIGHT: 171 LBS | HEIGHT: 66 IN | SYSTOLIC BLOOD PRESSURE: 137 MMHG | DIASTOLIC BLOOD PRESSURE: 83 MMHG | BODY MASS INDEX: 27.48 KG/M2

## 2021-07-20 DIAGNOSIS — Z11.3 SCREENING FOR STD (SEXUALLY TRANSMITTED DISEASE): Primary | ICD-10-CM

## 2021-07-20 DIAGNOSIS — N89.8 VAGINAL ODOR: ICD-10-CM

## 2021-07-20 DIAGNOSIS — N89.8 VAGINAL DISCHARGE: ICD-10-CM

## 2021-07-20 PROCEDURE — 99214 OFFICE O/P EST MOD 30 MIN: CPT | Performed by: NURSE PRACTITIONER

## 2021-07-20 RX ORDER — AMITRIPTYLINE HYDROCHLORIDE 25 MG/1
TABLET, FILM COATED ORAL
COMMUNITY
Start: 2021-07-19

## 2021-07-20 RX ORDER — METRONIDAZOLE 500 MG/1
500 TABLET ORAL 2 TIMES DAILY
Qty: 14 TABLET | Refills: 0 | Status: SHIPPED | OUTPATIENT
Start: 2021-07-20 | End: 2021-07-27

## 2021-07-20 NOTE — PROGRESS NOTES
Chief Complaint   Patient presents with   • Exposure to STD     STD check - PAtient would like blood work as well.         SUBJECTIVE:     Cristina Magana is a 36 y.o.  who presents requesting STD testing.  LMP absent with DMPA. She reports vaginal discharge and odor X1 month. Partner was unfaithful. Denies vaginal itching. Denies pelvic pain or dysuria. She has received her first covid vaccine and will be receiving her second dose tomorrow.     She is a pt of Dr Bacon's    Past Medical History:   Diagnosis Date   • Abnormal Pap smear of cervix    • Gestational hypertension    • Ovarian cyst    • Vitamin D deficiency       Past Surgical History:   Procedure Laterality Date   •  SECTION     • COLONOSCOPY  2018    [facility]   • TONSILLECTOMY     • UPPER GASTROINTESTINAL ENDOSCOPY  2018    Normal      Social History     Tobacco Use   • Smoking status: Current Every Day Smoker     Packs/day: 1.00     Types: Cigarettes   • Smokeless tobacco: Never Used   • Tobacco comment: 2 PPD   Substance Use Topics   • Alcohol use: No   • Drug use: No     OB History    Para Term  AB Living   3 1 1   1 2   SAB TAB Ectopic Molar Multiple Live Births   1                # Outcome Date GA Lbr Jcarlos/2nd Weight Sex Delivery Anes PTL Lv   3             2 SAB  6w0d          1 Term 04 38w0d  3487 g (7 lb 11 oz) M CS-LTranv           Review of Systems   Constitutional: Negative for chills, fatigue and fever.   Gastrointestinal: Negative for abdominal distention, abdominal pain, nausea and vomiting.   Genitourinary: Positive for vaginal discharge. Negative for dyspareunia, dysuria, frequency, menstrual problem, pelvic pain, urgency, vaginal bleeding and vaginal pain.        + Vaginal odor  - vaginal itching   Musculoskeletal: Negative for gait problem.   Skin: Negative for rash.   Psychiatric/Behavioral: Negative for behavioral problems.       OBJECTIVE:   Vitals:    21 1406   BP:  "137/83   Weight: 77.6 kg (171 lb)   Height: 166.4 cm (65.5\")        Physical Exam  Vitals and nursing note reviewed.   Constitutional:       Appearance: Normal appearance.   HENT:      Head: Normocephalic and atraumatic.   Eyes:      Pupils: Pupils are equal, round, and reactive to light.   Cardiovascular:      Rate and Rhythm: Normal rate.      Pulses: Normal pulses.   Pulmonary:      Effort: Pulmonary effort is normal.   Abdominal:      General: There is no distension.      Palpations: Abdomen is soft. There is no mass.      Tenderness: There is no abdominal tenderness. There is no guarding.      Hernia: No hernia is present. There is no hernia in the left inguinal area or right inguinal area.   Genitourinary:     General: Normal vulva.      Exam position: Lithotomy position.      Pubic Area: No rash or pubic lice.       Labia:         Right: No rash, tenderness, lesion or injury.         Left: No rash, tenderness, lesion or injury.       Urethra: No prolapse, urethral pain, urethral swelling or urethral lesion.      Vagina: No foreign body. Vaginal discharge (scant amount of white, frothy discharge noted) present. No erythema, tenderness or bleeding.      Cervix: Discharge present. No friability, lesion, erythema or cervical bleeding.      Uterus: Normal.       Adnexa: Right adnexa normal and left adnexa normal.   Musculoskeletal:         General: Normal range of motion.      Cervical back: Normal range of motion.   Lymphadenopathy:      Lower Body: No right inguinal adenopathy. No left inguinal adenopathy.   Skin:     General: Skin is warm and dry.   Neurological:      General: No focal deficit present.      Mental Status: She is alert and oriented to person, place, and time.      Cranial Nerves: No cranial nerve deficit.   Psychiatric:         Mood and Affect: Mood normal.         Behavior: Behavior normal.         Thought Content: Thought content normal.         Judgment: Judgment normal.         ASSESSMENT: "   1) Screening for STD  2) Vaginal discharge  3) Vaginal odor    PLAN:   NuSwab+ collected  Suspect BV will treat empirically with flagyl.  Advised no alcohol while taking flagyl  Encouraged condoms with intercourse  HIV, RPR, Hepatitis collected  She has upcoming AE scheduled    Follow up:PRN and annually       Amelia Mccall, APRN  7/20/2021  14:14 EDT

## 2021-07-21 LAB
HAV IGM SERPL QL IA: NEGATIVE
HBV CORE IGM SERPL QL IA: NEGATIVE
HBV SURFACE AG SERPL QL IA: NEGATIVE
HCV AB S/CO SERPL IA: <0.1 S/CO RATIO (ref 0–0.9)
HIV 1+2 AB+HIV1 P24 AG SERPL QL IA: NON REACTIVE
RPR SER QL: NORMAL

## 2021-07-28 ENCOUNTER — TELEPHONE (OUTPATIENT)
Dept: OBSTETRICS AND GYNECOLOGY | Facility: CLINIC | Age: 36
End: 2021-07-28

## 2021-07-28 NOTE — TELEPHONE ENCOUNTER
Patient called for lab results from 7/20. Would you be able to review the results?    Please advise,  Thank you

## 2021-07-28 NOTE — TELEPHONE ENCOUNTER
Testing showed BV.  Rx for Flagyl sent at her visit.  Other testing for chlamydia, gonorrhea, yeast, HIV, syphilis and Hepatitis were normal (negative)

## 2021-10-12 ENCOUNTER — OFFICE VISIT (OUTPATIENT)
Dept: GASTROENTEROLOGY | Facility: CLINIC | Age: 36
End: 2021-10-12

## 2021-10-12 VITALS — WEIGHT: 178.2 LBS | BODY MASS INDEX: 29.69 KG/M2 | HEIGHT: 65 IN | TEMPERATURE: 96.1 F

## 2021-10-12 DIAGNOSIS — R10.9 FUNCTIONAL ABDOMINAL PAIN SYNDROME: Primary | ICD-10-CM

## 2021-10-12 PROCEDURE — 99213 OFFICE O/P EST LOW 20 MIN: CPT | Performed by: NURSE PRACTITIONER

## 2021-10-12 RX ORDER — ESCITALOPRAM OXALATE 10 MG/1
TABLET ORAL
COMMUNITY
Start: 2021-08-17

## 2021-10-12 RX ORDER — DESIPRAMINE HYDROCHLORIDE 25 MG/1
25 TABLET ORAL 2 TIMES DAILY
Qty: 180 TABLET | Refills: 3 | Status: SHIPPED | OUTPATIENT
Start: 2021-10-12 | End: 2022-10-13

## 2021-10-12 RX ORDER — AMLODIPINE BESYLATE 5 MG/1
TABLET ORAL
COMMUNITY
Start: 2021-09-20

## 2021-10-12 NOTE — PROGRESS NOTES
Chief Complaint   Patient presents with   • Abdominal Pain       HPI    Cristina Magana is a  36 y.o. female here for a follow up visit for functional abdominal pain syndrome.    This patient follows with Dr. Chapa, new to me.    She has been maintained on desipramine 25 mg p.o. twice daily.    Needs a refill.  Overall she is been doing quite well since her last office visit.  Stress certainly plays a role with her functional abdominal pain syndrome but she is been working on stress reduction techniques better with Lexapro.  She denies nausea, vomiting, dysphagia, odynophagia on visit today.  Denies diarrhea, constipation, rectal bleeding.  She will be due for follow-up colonoscopy for screening purposes in .    Additional data reviewed:    EGD 2018 w/ normal findings.  Colonoscopy 2018 w/ hemorrhoids otherwise normal.    Past Medical History:   Diagnosis Date   • Abnormal Pap smear of cervix    • Gestational hypertension    • Ovarian cyst    • Vitamin D deficiency        Past Surgical History:   Procedure Laterality Date   •  SECTION     • COLONOSCOPY  2018    [facility]   • TONSILLECTOMY     • UPPER GASTROINTESTINAL ENDOSCOPY  2018    Normal       Scheduled Meds:     Continuous Infusions: No current facility-administered medications for this visit.      PRN Meds:     No Known Allergies    Social History     Socioeconomic History   • Marital status: Single   Tobacco Use   • Smoking status: Current Some Day Smoker     Packs/day: 0.25     Types: Cigarettes   • Smokeless tobacco: Never Used   • Tobacco comment: 2 PPD   Substance and Sexual Activity   • Alcohol use: No   • Drug use: No   • Sexual activity: Not Currently     Partners: Male       Family History   Problem Relation Age of Onset   • Hypertension Mother    • Ovarian cancer Sister    • Hypertension Sister    • Vitamin D deficiency Sister        Review of Systems   Constitutional: Negative for activity change, appetite change, fatigue,  fever and unexpected weight change.   HENT: Negative for trouble swallowing.    Respiratory: Negative for apnea, cough, choking, chest tightness, shortness of breath and wheezing.    Cardiovascular: Negative for chest pain, palpitations and leg swelling.   Gastrointestinal: Negative for abdominal distention, abdominal pain, anal bleeding, blood in stool, constipation, diarrhea, nausea, rectal pain and vomiting.       Vitals:    10/12/21 1316   Temp: 96.1 °F (35.6 °C)       Physical Exam  Constitutional:       Appearance: She is well-developed.   Abdominal:      General: Bowel sounds are normal. There is no distension.      Palpations: Abdomen is soft. There is no mass.      Tenderness: There is no abdominal tenderness. There is no guarding.      Hernia: No hernia is present.   Skin:     General: Skin is warm and dry.      Capillary Refill: Capillary refill takes less than 2 seconds.   Neurological:      Mental Status: She is alert and oriented to person, place, and time.   Psychiatric:         Behavior: Behavior normal.     Assessment    Diagnoses and all orders for this visit:    1. Functional abdominal pain syndrome (Primary)    Other orders  -     desipramine (NORPRAMIN) 25 MG tablet; Take 1 tablet by mouth 2 (Two) Times a Day.  Dispense: 180 tablet; Refill: 3    Plan    Stable functional abdominal pain syndrome on desipramine.  Refill provided.  No alarm symptoms on visit today to warrant endoscopic exam.  Return to clinic 6 months.         SUSAN Feng  [facility]  [address] [place] [address]  Office: [phone]

## 2021-10-14 ENCOUNTER — TELEPHONE (OUTPATIENT)
Dept: GASTROENTEROLOGY | Facility: CLINIC | Age: 36
End: 2021-10-14

## 2021-10-14 NOTE — TELEPHONE ENCOUNTER
Housing accommodation form signed by SUSAN Whaley and faxed to [facility] [phone]

## 2022-05-09 ENCOUNTER — OFFICE VISIT (OUTPATIENT)
Dept: OBSTETRICS AND GYNECOLOGY | Facility: CLINIC | Age: 37
End: 2022-05-09

## 2022-05-09 VITALS
BODY MASS INDEX: 29.99 KG/M2 | SYSTOLIC BLOOD PRESSURE: 144 MMHG | DIASTOLIC BLOOD PRESSURE: 88 MMHG | HEIGHT: 65 IN | WEIGHT: 180 LBS

## 2022-05-09 DIAGNOSIS — N89.8 VAGINAL DISCHARGE: ICD-10-CM

## 2022-05-09 DIAGNOSIS — I10 HYPERTENSION, UNSPECIFIED TYPE: ICD-10-CM

## 2022-05-09 DIAGNOSIS — Z11.3 SCREENING FOR STD (SEXUALLY TRANSMITTED DISEASE): ICD-10-CM

## 2022-05-09 DIAGNOSIS — Z01.419 WOMEN'S ANNUAL ROUTINE GYNECOLOGICAL EXAMINATION: Primary | ICD-10-CM

## 2022-05-09 PROCEDURE — 2014F MENTAL STATUS ASSESS: CPT | Performed by: NURSE PRACTITIONER

## 2022-05-09 PROCEDURE — 99213 OFFICE O/P EST LOW 20 MIN: CPT | Performed by: NURSE PRACTITIONER

## 2022-05-09 PROCEDURE — 99395 PREV VISIT EST AGE 18-39: CPT | Performed by: NURSE PRACTITIONER

## 2022-05-09 PROCEDURE — 3008F BODY MASS INDEX DOCD: CPT | Performed by: NURSE PRACTITIONER

## 2022-05-09 NOTE — PROGRESS NOTES
GYN Annual Exam     Chief Complaint   Patient presents with   • Gynecologic Exam     Annual exam - last pap 2020 negative. Patient gets depo provera at PCP, Dr. Hillman.      HPI    Cristina Magana is a 36 y.o. female who presents for annual well woman exam.  She is sexually active. Periods are absent with DMPA. No intermenstrual bleeding, spotting, or discharge. Performing SBE:occas. Reports significant family hx of cancers and would like mammogram screening. Reports both sets of grandparents  from cancer, she is not sure what type of cancer.     C/o vaginal discharge. C/o itching and odor.  Denies a change in soaps, hygiene products. She is not using douches. Denies new partners. Denies pelvic pain or dysuria. She would like STD testing today. C/o increased sweating in groin area. She is an every day smoker and drinks several servings of caffeine per day.     BP elevated today. Reports compliance with antihypertensive medications. Next PCP visit is in September. She does not monitor BP at home.     She is a patient of Dr. Ospina.    OB History        3    Para   1    Term   1            AB   1    Living   2       SAB   1    IAB        Ectopic        Molar        Multiple        Live Births                    LMP- absent  Current contraception: Depo-Provera injections prescribed by PCP  Last Pap- 2020 negative  History of abnormal Pap smear: yes - f/u WNL, no hx cervical procedures  History of STD-gonorrhea, trichomonas  Family history of uterine, colon or ovarian cancer: yes - sister ovarian cancer  Family history of breast cancer: no      Past Medical History:   Diagnosis Date   • Abnormal Pap smear of cervix    • Gestational hypertension    • Ovarian cyst    • Vitamin D deficiency        Past Surgical History:   Procedure Laterality Date   •  SECTION     • COLONOSCOPY  2018    [facility]   • TONSILLECTOMY     • UPPER GASTROINTESTINAL ENDOSCOPY  2018    Normal          Current Outpatient Medications:   •  amitriptyline (ELAVIL) 25 MG tablet, , Disp: , Rfl:   •  amLODIPine (NORVASC) 5 MG tablet, , Disp: , Rfl:   •  atorvastatin (LIPITOR) 20 MG tablet, , Disp: , Rfl:   •  desipramine (NORPRAMIN) 25 MG tablet, Take 1 tablet by mouth 2 (Two) Times a Day., Disp: 180 tablet, Rfl: 3  •  Drysol 20 % external solution, , Disp: , Rfl:   •  ergocalciferol (ERGOCALCIFEROL) 1.25 MG (38595 UT) capsule, ergocalciferol (vitamin D2) 1,250 mcg (50,000 unit) capsule, Disp: , Rfl:   •  escitalopram (LEXAPRO) 10 MG tablet, , Disp: , Rfl:   •  medroxyPROGESTERone Acetate 150 MG/ML suspension prefilled syringe, medroxyprogesterone 150 mg/mL intramuscular syringe, Disp: , Rfl:   •  oxyCODONE-acetaminophen (PERCOCET) 5-325 MG per tablet, , Disp: , Rfl:   •  tiZANidine (ZANAFLEX) 4 MG tablet, Every 12 (Twelve) Hours., Disp: , Rfl:     No Known Allergies    Social History     Tobacco Use   • Smoking status: Current Some Day Smoker     Packs/day: 0.25     Types: Cigarettes   • Smokeless tobacco: Never Used   • Tobacco comment: 2 PPD   Substance Use Topics   • Alcohol use: No   • Drug use: No       Family History   Problem Relation Age of Onset   • Lung cancer Father    • Hypertension Mother    • Heart attack Mother    • Ovarian cancer Sister    • Hypertension Sister    • Vitamin D deficiency Sister    • Breast cancer Cousin        Review of Systems   Constitutional: Positive for diaphoresis. Negative for chills, fatigue and fever.   Gastrointestinal: Negative for abdominal distention, abdominal pain, nausea and vomiting.   Genitourinary: Positive for vaginal discharge. Negative for breast discharge, breast lump, breast pain, dysuria, menstrual problem, pelvic pain, pelvic pressure, vaginal bleeding and vaginal pain.        + vaginal odor and itching    Musculoskeletal: Negative for gait problem.   Skin: Negative for rash.   Neurological: Negative for dizziness and headache.   Psychiatric/Behavioral:  "Negative for behavioral problems.       /88   Ht 165.1 cm (65\")   Wt 81.6 kg (180 lb)   BMI 29.95 kg/m²     Physical Exam  Constitutional:       General: She is not in acute distress.     Appearance: Normal appearance. She is not ill-appearing, toxic-appearing or diaphoretic.   Genitourinary:      Vulva, bladder and urethral meatus normal.      No lesions in the vagina.      Right Labia: No rash, tenderness, lesions, skin changes or Bartholin's cyst.     Left Labia: No tenderness, lesions, skin changes, Bartholin's cyst or rash.     No labial fusion noted.      No inguinal adenopathy present in the right or left side.     No vaginal discharge, erythema, tenderness, bleeding or ulceration.      No vaginal prolapse present.     No vaginal atrophy present.       Right Adnexa: not tender, not full, not palpable, no mass present and not absent.     Left Adnexa: not tender, not full, not palpable, no mass present and not absent.     No cervical motion tenderness, discharge, friability, lesion, polyp, nabothian cyst or eversion.      Uterus is not enlarged, fixed, tender, irregular or prolapsed.      No uterine mass detected.     No urethral tenderness or mass present.      Pelvic exam was performed with patient in the lithotomy position.   Breasts: Breasts are symmetrical.      Right: Present. No swelling, bleeding, inverted nipple, mass, nipple discharge, skin change, tenderness, breast implant, axillary adenopathy or supraclavicular adenopathy.      Left: Present. No swelling, bleeding, inverted nipple, mass, nipple discharge, skin change, tenderness, breast implant, axillary adenopathy or supraclavicular adenopathy.       HENT:      Head: Normocephalic and atraumatic.   Eyes:      Pupils: Pupils are equal, round, and reactive to light.   Cardiovascular:      Rate and Rhythm: Normal rate.   Pulmonary:      Effort: Pulmonary effort is normal.   Abdominal:      General: There is no distension.      Palpations: " Abdomen is soft. There is no mass.      Tenderness: There is no abdominal tenderness. There is no guarding.      Hernia: No hernia is present. There is no hernia in the left inguinal area or right inguinal area.   Musculoskeletal:         General: Normal range of motion.      Cervical back: Normal range of motion and neck supple. No tenderness.   Lymphadenopathy:      Cervical: No cervical adenopathy.      Upper Body:      Right upper body: No supraclavicular, axillary or pectoral adenopathy.      Left upper body: No supraclavicular, axillary or pectoral adenopathy.      Lower Body: No right inguinal adenopathy. No left inguinal adenopathy.   Neurological:      General: No focal deficit present.      Mental Status: She is alert and oriented to person, place, and time.      Cranial Nerves: No cranial nerve deficit.   Skin:     General: Skin is warm and dry.   Psychiatric:         Mood and Affect: Mood normal.         Behavior: Behavior normal.         Thought Content: Thought content normal.         Judgment: Judgment normal.   Vitals and nursing note reviewed.       Assessment   Diagnoses and all orders for this visit:    1. Women's annual routine gynecological examination (Primary)  -     IGP, Rfx Aptima HPV ASCU    2. Screening for STD (sexually transmitted disease)  -     NuSwab VG+ - Swab, Vagina  -     HIV-1 / O / 2 Ag / Antibody 4th Generation  -     RPR  -     Hepatitis B Surface Antigen  -     Hepatitis C Antibody    3. Vaginal discharge    4. Hypertension, unspecified type       Plan   1. Well woman exam: Pap collected Yes. Recommend MVI daily.    2. Contraception: on DMPA, prescribed by PCP   3. STD: Enc condoms. Desires STD screen today- Yes. Serum and NuSwab  4. Smoking status: current every day smoker  5.  Encouraged annual mammogram screening starting at age 40. Instructed on how to perform SBE. Encouraged breast health self awareness. She will schedule early baseline screening mammogram  6.     Encouraged 150 minutes of exercise per week if not medially contraindicated.   7.    Overweight by BMI (29.95)  8.    Repeat /88, encouraged to f/u with PCP regarding HTN.   9.   No abnormal discharge noted on exam, will await vaginal cultures and treat if indicated  10. Encouraged tobacco cessation and decreased caffeine. Enc increased hydration to help with increased sweating.     Follow Up one year or PRN    Amelia Mccall, APRN  5/9/2022  14:48 EDT

## 2022-05-10 LAB
HBV SURFACE AG SERPL QL IA: NEGATIVE
HCV AB S/CO SERPL IA: <0.1 S/CO RATIO (ref 0–0.9)
HIV 1+2 AB+HIV1 P24 AG SERPL QL IA: NON REACTIVE
RPR SER QL: NON REACTIVE

## 2022-05-11 ENCOUNTER — TELEPHONE (OUTPATIENT)
Dept: OBSTETRICS AND GYNECOLOGY | Facility: CLINIC | Age: 37
End: 2022-05-11

## 2022-05-11 RX ORDER — METRONIDAZOLE 500 MG/1
500 TABLET ORAL 2 TIMES DAILY
Qty: 14 TABLET | Refills: 0 | Status: SHIPPED | OUTPATIENT
Start: 2022-05-11 | End: 2022-05-18

## 2022-05-11 NOTE — PROGRESS NOTES
Please let the patient know that her vaginal culture results were positive for bacterial vaginosis.  I have sent a prescription for flagyl to her pharmacy to treat. She should not drink alcohol while taking this medication. Blood work was negative for HIV, syphilis, and hepatitis B & C. Pap smear is still pending. Thanks

## 2022-05-11 NOTE — TELEPHONE ENCOUNTER
----- Message from SUSAN Agustin sent at 5/11/2022  9:51 AM EDT -----  Please let the patient know that her vaginal culture results were positive for bacterial vaginosis.  I have sent a prescription for flagyl to her pharmacy to treat. She should not drink alcohol while taking this medication. Blood work was negative fo  r HIV, syphilis, and hepatitis B & C. Pap smear is still pending. Thanks

## 2022-05-13 LAB
CONV .: NORMAL
CYTOLOGIST CVX/VAG CYTO: NORMAL
CYTOLOGY CVX/VAG DOC CYTO: NORMAL
CYTOLOGY CVX/VAG DOC THIN PREP: NORMAL
DX ICD CODE: NORMAL
HIV 1 & 2 AB SER-IMP: NORMAL
OTHER STN SPEC: NORMAL
STAT OF ADQ CVX/VAG CYTO-IMP: NORMAL

## 2022-10-13 RX ORDER — DESIPRAMINE HYDROCHLORIDE 25 MG/1
TABLET ORAL
Qty: 180 TABLET | Refills: 3 | Status: SHIPPED | OUTPATIENT
Start: 2022-10-13

## 2022-12-02 ENCOUNTER — OFFICE VISIT (OUTPATIENT)
Dept: GASTROENTEROLOGY | Facility: CLINIC | Age: 37
End: 2022-12-02

## 2022-12-02 VITALS
SYSTOLIC BLOOD PRESSURE: 147 MMHG | DIASTOLIC BLOOD PRESSURE: 90 MMHG | TEMPERATURE: 97.5 F | WEIGHT: 181.8 LBS | BODY MASS INDEX: 30.29 KG/M2 | HEIGHT: 65 IN

## 2022-12-02 DIAGNOSIS — R10.9 FUNCTIONAL ABDOMINAL PAIN SYNDROME: Primary | ICD-10-CM

## 2022-12-02 PROCEDURE — 99214 OFFICE O/P EST MOD 30 MIN: CPT | Performed by: NURSE PRACTITIONER

## 2022-12-02 RX ORDER — HYDROCODONE BITARTRATE AND ACETAMINOPHEN 7.5; 325 MG/1; MG/1
TABLET ORAL
COMMUNITY
Start: 2022-11-14

## 2022-12-02 NOTE — PROGRESS NOTES
Chief Complaint   Patient presents with   • Abdominal Pain       HPI    Cristina Magana is a  37 y.o. female here for a follow up visit for functional abdominal pain syndrome.    This patient follows with Dr. Chapa and myself.    She has been maintained on desipramine 25 mg p.o. twice daily.  She had 1 flareup of APS after she ate a pint of D[employer] ice cream outside of this she has been feeling fine.  Currently without abdominal pain, diarrhea, constipation, rectal bleeding.   Appetite is good.  Weight is stable.  No upper GI complaints.  She wants to taper off Lexapro through her primary care provider.    Additional data reviewed:     EGD 2018 w/ normal findings.  Colonoscopy 2018 w/ hemorrhoids otherwise normal.    Past Medical History:   Diagnosis Date   • Abnormal Pap smear of cervix    • Gestational hypertension    • Ovarian cyst    • Vitamin D deficiency        Past Surgical History:   Procedure Laterality Date   •  SECTION     • COLONOSCOPY  2018    [facility]   • TONSILLECTOMY     • UPPER GASTROINTESTINAL ENDOSCOPY  2018    Normal       Scheduled Meds:     Continuous Infusions: No current facility-administered medications for this visit.      PRN Meds:     No Known Allergies    Social History     Socioeconomic History   • Marital status: Single   Tobacco Use   • Smoking status: Some Days     Packs/day: 0.25     Types: Cigarettes   • Smokeless tobacco: Never   • Tobacco comments:     2 PPD   Substance and Sexual Activity   • Alcohol use: No   • Drug use: No   • Sexual activity: Not Currently     Partners: Male       Family History   Problem Relation Age of Onset   • Hypertension Mother    • Heart attack Mother    • Lung cancer Father    • Ovarian cancer Sister    • Hypertension Sister    • Vitamin D deficiency Sister    • Breast cancer Cousin        Review of Systems   Constitutional: Negative for activity change, appetite change, fatigue, fever and unexpected weight change.   HENT:  Negative for trouble swallowing.    Respiratory: Negative for apnea, cough, choking, chest tightness, shortness of breath and wheezing.    Cardiovascular: Negative for chest pain, palpitations and leg swelling.   Gastrointestinal: Negative for abdominal distention, abdominal pain, anal bleeding, blood in stool, constipation, diarrhea, nausea, rectal pain and vomiting.       Vitals:    12/02/22 0951   BP: 147/90   Temp: 97.5 °F (36.4 °C)       Physical Exam  Constitutional:       Appearance: She is well-developed.   Abdominal:      General: Bowel sounds are normal. There is no distension.      Palpations: Abdomen is soft. There is no mass.      Tenderness: There is no abdominal tenderness. There is no guarding.      Hernia: No hernia is present.   Skin:     General: Skin is warm and dry.      Capillary Refill: Capillary refill takes less than 2 seconds.   Neurological:      Mental Status: She is alert and oriented to person, place, and time.   Psychiatric:         Behavior: Behavior normal.     Assessment    Diagnoses and all orders for this visit:    1. Functional abdominal pain syndrome (Primary)       Plan    Stable functional abdominal pain syndrome.  Continue twice daily dosing desipramine.  No alarm symptoms on visit today want endoscopic examination.  Follow-up 6 months to 1 year.         SUSAN Feng  [facility]  [address] [place] [address]  Office: [phone]    I spent 30 minutes caring for Cristina on this date of service. This time includes time spent by me in the following activities: preparing for the visit, reviewing tests, obtaining and/or reviewing a separately obtained history, performing a medically appropriate examination and/or evaluation , counseling and educating the patient/family/caregiver, ordering medications, tests, or procedures, documenting information in the medical record, independently interpreting results and communicating that  information with the patient/family/caregiver and care coordination.

## 2023-08-31 ENCOUNTER — TELEPHONE (OUTPATIENT)
Dept: OBSTETRICS AND GYNECOLOGY | Facility: CLINIC | Age: 38
End: 2023-08-31

## 2023-08-31 NOTE — TELEPHONE ENCOUNTER
Caller: Cristina Magana    Relationship to patient: Self    Best call back number: [phone] (home)      PT RESCHEDULED TODAY'S APPT(3:30) FOR 11-@2:45PM.  THANK YOU.

## 2023-09-08 ENCOUNTER — OFFICE VISIT (OUTPATIENT)
Dept: GASTROENTEROLOGY | Facility: CLINIC | Age: 38
End: 2023-09-08
Payer: COMMERCIAL

## 2023-09-08 VITALS
SYSTOLIC BLOOD PRESSURE: 137 MMHG | HEIGHT: 66 IN | BODY MASS INDEX: 29.63 KG/M2 | HEART RATE: 81 BPM | WEIGHT: 184.4 LBS | OXYGEN SATURATION: 98 % | DIASTOLIC BLOOD PRESSURE: 94 MMHG | TEMPERATURE: 97.8 F

## 2023-09-08 DIAGNOSIS — R10.9 FUNCTIONAL ABDOMINAL PAIN SYNDROME: Primary | ICD-10-CM

## 2023-09-08 PROCEDURE — 99213 OFFICE O/P EST LOW 20 MIN: CPT | Performed by: NURSE PRACTITIONER

## 2023-09-08 PROCEDURE — 1160F RVW MEDS BY RX/DR IN RCRD: CPT | Performed by: NURSE PRACTITIONER

## 2023-09-08 PROCEDURE — 1159F MED LIST DOCD IN RCRD: CPT | Performed by: NURSE PRACTITIONER

## 2023-09-08 RX ORDER — DESIPRAMINE HYDROCHLORIDE 10 MG/1
10 TABLET ORAL DAILY
Qty: 30 TABLET | Refills: 5 | Status: SHIPPED | OUTPATIENT
Start: 2023-09-08

## 2023-09-08 RX ORDER — SULFAMETHOXAZOLE AND TRIMETHOPRIM 800; 160 MG/1; MG/1
TABLET ORAL
COMMUNITY
Start: 2023-05-30 | End: 2023-09-08

## 2023-09-08 RX ORDER — ESTRADIOL 2 MG/1
TABLET ORAL
COMMUNITY
Start: 2023-07-11 | End: 2023-09-08

## 2023-09-08 NOTE — PROGRESS NOTES
Chief Complaint   Patient presents with    Functional abdominal pain syndrome       HPI    Cristina Magana is a  38 y.o. female here for a follow up visit for functional abdominal pain syndrome.    This patient follows with Dr. Chapa and myself.    She has been maintained on desipramine 25 mg p.o. twice daily.    On visit today she reports feeling quite well.  She is taper down to once daily desipramine and would like to taper down further.  No nausea, vomiting, dysphagia, odynophagia.  No diarrhea, constipation, rectal bleeding.    Additional data reviewed:     EGD 2018 w/ normal findings.    Colonoscopy 2018 w/ hemorrhoids otherwise normal.    Past Medical History:   Diagnosis Date    Abnormal Pap smear of cervix     Gestational hypertension     Ovarian cyst     Vitamin D deficiency        Past Surgical History:   Procedure Laterality Date     SECTION      COLONOSCOPY  2018    [facility]    TONSILLECTOMY      UPPER GASTROINTESTINAL ENDOSCOPY  2018    Normal       Scheduled Meds:     Continuous Infusions: No current facility-administered medications for this visit.      PRN Meds:     No Known Allergies    Social History     Socioeconomic History    Marital status: Single   Tobacco Use    Smoking status: Some Days     Packs/day: 0.25     Types: Cigarettes    Smokeless tobacco: Never    Tobacco comments:     2 PPD   Substance and Sexual Activity    Alcohol use: No    Drug use: No    Sexual activity: Not Currently     Partners: Male       Family History   Problem Relation Age of Onset    Hypertension Mother     Heart attack Mother     Lung cancer Father     Ovarian cancer Sister     Hypertension Sister     Vitamin D deficiency Sister     Breast cancer Cousin        Review of Systems   Constitutional:  Negative for appetite change and unexpected weight change.   HENT:  Negative for trouble swallowing.    Gastrointestinal: Negative.      Vitals:    23 1038   BP: 137/94   Pulse: 81   Temp: 97.8  °F (36.6 °C)   SpO2: 98%       Physical Exam  Constitutional:       Appearance: She is well-developed.   Abdominal:      General: Bowel sounds are normal. There is no distension.      Palpations: Abdomen is soft. There is no mass.      Tenderness: There is no abdominal tenderness. There is no guarding.      Hernia: No hernia is present.   Skin:     General: Skin is warm and dry.      Capillary Refill: Capillary refill takes less than 2 seconds.   Neurological:      Mental Status: She is alert and oriented to person, place, and time.   Psychiatric:         Behavior: Behavior normal.     Assessment    Diagnoses and all orders for this visit:    1. Functional abdominal pain syndrome (Primary)    Other orders  -     desipramine (NOPRAMIN) 10 MG tablet; Take 1 tablet by mouth Daily.  Dispense: 30 tablet; Refill: 5    Plan    Stable functional abdominal pain syndrome.  Patient would like to taper further down on desipramine which is reasonable.  Recommend decreasing to 10 mg a day and patient can taper off completely after 1 month if she feels ready.  No alarm symptoms on visit today to warrant endoscopic examination.  Patient to follow-up with our services 1 year or sooner if needed.         SUSAN Feng  [facility]  [address] [place] [address]  Office: [phone]

## 2023-11-07 ENCOUNTER — OFFICE VISIT (OUTPATIENT)
Dept: OBSTETRICS AND GYNECOLOGY | Facility: CLINIC | Age: 38
End: 2023-11-07
Payer: COMMERCIAL

## 2023-11-07 VITALS
HEIGHT: 66 IN | SYSTOLIC BLOOD PRESSURE: 139 MMHG | BODY MASS INDEX: 28.61 KG/M2 | DIASTOLIC BLOOD PRESSURE: 90 MMHG | WEIGHT: 178 LBS

## 2023-11-07 DIAGNOSIS — Z11.3 SCREENING FOR STD (SEXUALLY TRANSMITTED DISEASE): ICD-10-CM

## 2023-11-07 DIAGNOSIS — I10 HYPERTENSION, UNSPECIFIED TYPE: ICD-10-CM

## 2023-11-07 DIAGNOSIS — R39.9 UTI SYMPTOMS: ICD-10-CM

## 2023-11-07 DIAGNOSIS — N89.8 VAGINAL DISCHARGE: ICD-10-CM

## 2023-11-07 DIAGNOSIS — Z01.419 WOMEN'S ANNUAL ROUTINE GYNECOLOGICAL EXAMINATION: Primary | ICD-10-CM

## 2023-11-07 LAB
BILIRUB BLD-MCNC: NEGATIVE MG/DL
GLUCOSE UR STRIP-MCNC: NEGATIVE MG/DL
KETONES UR QL: NEGATIVE
LEUKOCYTE EST, POC: NEGATIVE
NITRITE UR-MCNC: NEGATIVE MG/ML
PH UR: 6 [PH] (ref 5–8)
PROT UR STRIP-MCNC: NEGATIVE MG/DL
RBC # UR STRIP: ABNORMAL /UL
SP GR UR: 1.01 (ref 1–1.03)
UROBILINOGEN UR QL: ABNORMAL

## 2023-11-07 RX ORDER — NITROFURANTOIN 25; 75 MG/1; MG/1
100 CAPSULE ORAL 2 TIMES DAILY
Qty: 10 CAPSULE | Refills: 0 | Status: SHIPPED | OUTPATIENT
Start: 2023-11-07 | End: 2023-11-12

## 2023-11-07 NOTE — PROGRESS NOTES
GYN Annual Exam     Chief Complaint   Patient presents with    Gynecologic Exam     AE, Last pap  NIL, Would like urine checked for possible UTI     HPI    Cristina Magana is a 38 y.o. female who presents for annual well woman exam.  She is sexually active. Periods are irregular, lasting 5-6 days. No intermenstrual bleeding, spotting, or discharge. Performing SBE:completes. Stopped DMPA, last injection was May 2023, stopped due to AUB. She is not interested in other contraceptive options. She has had 3 menses since stopping.      Requests to have urine checked for UTI today. C/o left side pain and lower abdominal pain with urination for 1-2 days. Denies burning with urination. Denies fevers, chills, or N&V.     C/o vaginal discharge for a few months. There is intermittent itching and odor. There is no itching present today however. Denies pelvic pain. She is not sexually active. Last IC was 19 months ago.     BP is elevated. She states she does not take antihypertensive medication every day    She is a patient of Dr. Ospina.    OB History          3    Para   1    Term   1            AB   1    Living   2         SAB   1    IAB        Ectopic        Molar        Multiple        Live Births                    LMP- unsure of exact date  Current contraception: abstinence, last IC 19 months ago   Last Pap- 2022 NIL  History of abnormal Pap smear: yes - f/u WNL, no hx cervical procedures  History of STD-gonorrhea, trichomonas  Family history of uterine, colon or ovarian cancer: yes - sister ovarian cancer  Family history of breast cancer: no  Mammogram- never    Past Medical History:   Diagnosis Date    Abnormal Pap smear of cervix     Gestational hypertension     Ovarian cyst     Vitamin D deficiency        Past Surgical History:   Procedure Laterality Date     SECTION      COLONOSCOPY  2018    [facility]    TONSILLECTOMY      UPPER GASTROINTESTINAL ENDOSCOPY  2018    Normal          Current Outpatient Medications:     amitriptyline (ELAVIL) 25 MG tablet, , Disp: , Rfl:     amLODIPine (NORVASC) 5 MG tablet, , Disp: , Rfl:     atorvastatin (LIPITOR) 20 MG tablet, , Disp: , Rfl:     desipramine (NOPRAMIN) 10 MG tablet, Take 1 tablet by mouth Daily., Disp: 30 tablet, Rfl: 5    Drysol 20 % external solution, , Disp: , Rfl:     ergocalciferol (ERGOCALCIFEROL) 1.25 MG (06520 UT) capsule, ergocalciferol (vitamin D2) 1,250 mcg (50,000 unit) capsule, Disp: , Rfl:     escitalopram (LEXAPRO) 10 MG tablet, , Disp: , Rfl:     HYDROcodone-acetaminophen (NORCO) 7.5-325 MG per tablet, , Disp: , Rfl:     tiZANidine (ZANAFLEX) 4 MG tablet, Every 12 (Twelve) Hours., Disp: , Rfl:     nitrofurantoin, macrocrystal-monohydrate, (Macrobid) 100 MG capsule, Take 1 capsule by mouth 2 (Two) Times a Day for 5 days., Disp: 10 capsule, Rfl: 0    No Known Allergies    Social History     Tobacco Use    Smoking status: Every Day     Packs/day: .25     Types: Cigarettes    Smokeless tobacco: Never    Tobacco comments:     2 PPD   Vaping Use    Vaping Use: Never used   Substance Use Topics    Alcohol use: No    Drug use: No       Family History   Problem Relation Age of Onset    Hypertension Mother     Heart attack Mother     Lung cancer Father     Ovarian cancer Sister     Hypertension Sister     Vitamin D deficiency Sister     Breast cancer Cousin        Review of Systems   Constitutional:  Negative for chills, fatigue and fever.   Gastrointestinal:  Positive for abdominal distention (suprapubic pain during urination) and abdominal pain (left side pain). Negative for nausea and vomiting.   Endocrine: Negative for cold intolerance and heat intolerance.   Genitourinary:  Positive for menstrual problem and vaginal discharge. Negative for breast discharge, breast lump, breast pain, dysuria, frequency, pelvic pain, pelvic pressure, urgency, vaginal bleeding and vaginal pain.   Musculoskeletal:  Negative for gait problem.  "  Skin:  Negative for rash.   Neurological:  Negative for dizziness and headache.   Psychiatric/Behavioral:  Negative for behavioral problems.        /90   Ht 167.6 cm (66\")   Wt 80.7 kg (178 lb)   BMI 28.73 kg/m²     Physical Exam  Constitutional:       General: She is not in acute distress.     Appearance: Normal appearance. She is not ill-appearing, toxic-appearing or diaphoretic.   Genitourinary:      Vulva, bladder and urethral meatus normal.      No lesions in the vagina.      Right Labia: No rash, tenderness, lesions, skin changes or Bartholin's cyst.     Left Labia: No tenderness, lesions, skin changes, Bartholin's cyst or rash.     No labial fusion noted.      No inguinal adenopathy present in the right or left side.     Vaginal discharge (thick, curd like) present.      No vaginal erythema, tenderness, bleeding or ulceration.      No vaginal prolapse present.     No vaginal atrophy present.       Right Adnexa: not tender, not full, not palpable, no mass present and not absent.     Left Adnexa: not tender, not full, not palpable, no mass present and not absent.     No cervical motion tenderness, discharge, friability, lesion, polyp, nabothian cyst or eversion.      Uterus is not enlarged, fixed, tender, irregular or prolapsed.      No uterine mass detected.     No urethral tenderness or mass present.      Pelvic exam was performed with patient in the lithotomy position.   Breasts:     Breasts are symmetrical.      Right: Present. No swelling, bleeding, inverted nipple, mass, nipple discharge, skin change, tenderness or breast implant.      Left: Present. No swelling, bleeding, inverted nipple, mass, nipple discharge, skin change, tenderness or breast implant.   HENT:      Head: Normocephalic and atraumatic.   Eyes:      Pupils: Pupils are equal, round, and reactive to light.   Cardiovascular:      Rate and Rhythm: Normal rate.   Pulmonary:      Effort: Pulmonary effort is normal.   Abdominal:      " General: There is no distension.      Palpations: Abdomen is soft. There is no mass.      Tenderness: There is no abdominal tenderness. There is no guarding.      Hernia: No hernia is present. There is no hernia in the left inguinal area or right inguinal area.   Musculoskeletal:         General: Normal range of motion.      Cervical back: Normal range of motion and neck supple. No tenderness.   Lymphadenopathy:      Cervical: No cervical adenopathy.      Upper Body:      Right upper body: No supraclavicular, axillary or pectoral adenopathy.      Left upper body: No supraclavicular, axillary or pectoral adenopathy.      Lower Body: No right inguinal adenopathy. No left inguinal adenopathy.   Neurological:      General: No focal deficit present.      Mental Status: She is alert and oriented to person, place, and time.      Cranial Nerves: No cranial nerve deficit.   Skin:     General: Skin is warm and dry.   Psychiatric:         Mood and Affect: Mood normal.         Behavior: Behavior normal.         Thought Content: Thought content normal.         Judgment: Judgment normal.   Vitals and nursing note reviewed.       Assessment   Diagnoses and all orders for this visit:    1. Women's annual routine gynecological examination (Primary)  -     IGP, Apt HPV,rfx 16 / 18,45    2. Screening for STD (sexually transmitted disease)  -     HIV-1 / O / 2 Ag / Antibody  -     RPR, Rfx Qn RPR / Confirm TP  -     Hepatitis B Surface Antigen  -     Hepatitis C Antibody    3. UTI symptoms  -     Urine Culture - Urine, Urine, Clean Catch  -     POC Urinalysis Dipstick  -     nitrofurantoin, macrocrystal-monohydrate, (Macrobid) 100 MG capsule; Take 1 capsule by mouth 2 (Two) Times a Day for 5 days.  Dispense: 10 capsule; Refill: 0    4. Vaginal discharge  -     NuSwab VG+ - Swab, Vagina    5. Hypertension, unspecified type       Plan   Well woman exam: Pap collected Yes. Recommend MVI daily.    Contraception: declines  STD: Enc condoms.  Desires STD screen today- Yes. Serum and NuSwab  Smoking status: current every day smoker   Encouraged annual mammogram screening starting at age 40. Instructed on how to perform SBE. Encouraged breast health self awareness.  6.    Encouraged 150 minutes of exercise per week if not medially contraindicated.   7.    Overweight by BMI 28.73  8.   Urine dip + blood, negative nitrites and leukocytes. Sent for urine culture. Will begin macrobid today as a precaution  9.   Vaginal discharge consistent with yeast. She is not having itching today Cultures obtained. Will await results and treat if indicated  10.  Noncompliant with antihypertensive medications. Discussed long term risks of uncontrolled HTN including but not limited to heart damage, renal damage and visual damage. Encouraged compliance    Follow Up one year or PRN    Amelia Mccall, APRN  11/7/2023  18:09 EST

## 2023-11-08 LAB
HBV SURFACE AG SERPL QL IA: NEGATIVE
HCV IGG SERPL QL IA: NON REACTIVE
HIV 1+2 AB+HIV1 P24 AG SERPL QL IA: NON REACTIVE
RPR SER QL: NON REACTIVE

## 2023-11-09 LAB
A VAGINAE DNA VAG QL NAA+PROBE: NORMAL SCORE
BACTERIA UR CULT: NORMAL
BACTERIA UR CULT: NORMAL
BVAB2 DNA VAG QL NAA+PROBE: NORMAL SCORE
C ALBICANS DNA VAG QL NAA+PROBE: NEGATIVE
C GLABRATA DNA VAG QL NAA+PROBE: NEGATIVE
C TRACH DNA VAG QL NAA+PROBE: NEGATIVE
MEGA1 DNA VAG QL NAA+PROBE: NORMAL SCORE
N GONORRHOEA DNA VAG QL NAA+PROBE: NEGATIVE
T VAGINALIS DNA VAG QL NAA+PROBE: NEGATIVE

## 2023-11-09 NOTE — PROGRESS NOTES
Pt is not using MyChart. Please call the pt and let her know that her vaginal and urine cultures were negative for infection. Her blood work was negative for HIV, syphilis, and hepatitis B&C. Thank you

## 2023-11-10 LAB
CYTOLOGIST CVX/VAG CYTO: NORMAL
CYTOLOGY CVX/VAG DOC CYTO: NORMAL
CYTOLOGY CVX/VAG DOC THIN PREP: NORMAL
DX ICD CODE: NORMAL
HIV 1 & 2 AB SER-IMP: NORMAL
HPV I/H RISK 4 DNA CVX QL PROBE+SIG AMP: NEGATIVE
OTHER STN SPEC: NORMAL
STAT OF ADQ CVX/VAG CYTO-IMP: NORMAL

## 2024-01-11 ENCOUNTER — TELEPHONE (OUTPATIENT)
Dept: OBSTETRICS AND GYNECOLOGY | Facility: CLINIC | Age: 39
End: 2024-01-11

## 2024-01-11 NOTE — TELEPHONE ENCOUNTER
Caller: Cristina Magana    Relationship: Self    Best call back number: [phone]    What is the best time to reach you: ANYTIME, OK TO LEAVE A     What was the call regarding: PT WOULD LIKE A CALLBACK FROM A MA ASAP, PT STATES SHE'S HAVING MENSTRUAL PAINS, HAS TAKEN 1000NG TYLENOL @7:00AM 800MG IBUPROFEN @ 9:00AM, PAIN IS STILL THERE, PT LAYING ON HEATING PAD, NOTHING IS HELPING, PLEASE ADVISE.

## 2024-01-11 NOTE — TELEPHONE ENCOUNTER
Shakira, is she feeling better after taking the motrin and tylenol?  I recommend ibuprofen and low heating pad PRN pain. If this does not improve I would consider an ultrasound. At her last visit she was not interested in contraceptives to manage this. If she has changed her mind she needs f/u to discuss options. -Amelia

## 2024-01-11 NOTE — TELEPHONE ENCOUNTER
I spoke with pt and stated she is not bleeding through a pad an hour. Issue is with pain/cramping. States she was on the depo for 5 years due to painful cycles and has been off for 4 months. Asked what you advised her to do further.

## 2024-01-11 NOTE — TELEPHONE ENCOUNTER
Pt is feeling better from medication knows to keep up with ibuprofen and low heating pad prn. Did want to get scheduled for u/s, she states she has a hx of ovarian cysts. Would like to discuss further options at that appt. SM

## 2024-03-14 ENCOUNTER — APPOINTMENT (OUTPATIENT)
Dept: GENERAL RADIOLOGY | Facility: HOSPITAL | Age: 39
End: 2024-03-14
Payer: COMMERCIAL

## 2024-03-14 PROCEDURE — 72100 X-RAY EXAM L-S SPINE 2/3 VWS: CPT

## 2024-03-14 PROCEDURE — 99283 EMERGENCY DEPT VISIT LOW MDM: CPT

## 2024-03-14 PROCEDURE — 72050 X-RAY EXAM NECK SPINE 4/5VWS: CPT

## 2024-03-14 PROCEDURE — 72072 X-RAY EXAM THORAC SPINE 3VWS: CPT

## 2024-03-14 RX ORDER — ACETAMINOPHEN 500 MG
1000 TABLET ORAL ONCE
Status: COMPLETED | OUTPATIENT
Start: 2024-03-14 | End: 2024-03-15

## 2024-03-15 ENCOUNTER — HOSPITAL ENCOUNTER (EMERGENCY)
Facility: HOSPITAL | Age: 39
Discharge: HOME | End: 2024-03-15
Attending: EMERGENCY MEDICINE
Payer: COMMERCIAL

## 2024-03-15 VITALS
SYSTOLIC BLOOD PRESSURE: 150 MMHG | TEMPERATURE: 98.1 F | BODY MASS INDEX: 26.7 KG/M2 | RESPIRATION RATE: 18 BRPM | HEIGHT: 65 IN | DIASTOLIC BLOOD PRESSURE: 105 MMHG | OXYGEN SATURATION: 96 % | HEART RATE: 77 BPM | WEIGHT: 160.27 LBS
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
## 2024-03-15 DIAGNOSIS — S23.9XXA THORACIC BACK SPRAIN, INITIAL ENCOUNTER: ICD-10-CM

## 2024-03-15 DIAGNOSIS — S13.9XXA NECK SPRAIN, INITIAL ENCOUNTER: ICD-10-CM

## 2024-03-15 DIAGNOSIS — S33.5XXA LUMBAR SPRAIN, INITIAL ENCOUNTER: ICD-10-CM

## 2024-03-15 PROCEDURE — 25010000002 KETOROLAC TROMETHAMINE PER 15 MG: Performed by: EMERGENCY MEDICINE

## 2024-03-15 PROCEDURE — 96372 THER/PROPH/DIAG INJ SC/IM: CPT

## 2024-03-15 RX ORDER — CYCLOBENZAPRINE HCL 10 MG
10 TABLET ORAL 3 TIMES DAILY PRN
Qty: 9 TABLET | Refills: 0 | Status: SHIPPED | OUTPATIENT
Start: 2024-03-15

## 2024-03-15 RX ORDER — KETOROLAC TROMETHAMINE 30 MG/ML
30 INJECTION, SOLUTION INTRAMUSCULAR; INTRAVENOUS ONCE
Status: COMPLETED | OUTPATIENT
Start: 2024-03-15 | End: 2024-03-15

## 2024-03-15 RX ORDER — NAPROXEN 500 MG/1
500 TABLET ORAL 2 TIMES DAILY WITH MEALS
Qty: 14 TABLET | Refills: 0 | Status: SHIPPED | OUTPATIENT
Start: 2024-03-15

## 2024-03-15 RX ADMIN — KETOROLAC TROMETHAMINE 30 MG: 30 INJECTION, SOLUTION INTRAMUSCULAR; INTRAVENOUS at 01:09

## 2024-03-15 RX ADMIN — ACETAMINOPHEN 1000 MG: 500 TABLET ORAL at 01:08
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
## 2024-03-25 RX ORDER — DESIPRAMINE HYDROCHLORIDE 10 MG/1
10 TABLET ORAL DAILY
Qty: 90 TABLET | Refills: 3 | Status: SHIPPED | OUTPATIENT
Start: 2024-03-25

## 2024-05-02 ENCOUNTER — HOSPITAL ENCOUNTER (EMERGENCY)
Facility: HOSPITAL | Age: 39
Discharge: HOME | End: 2024-05-03
Attending: EMERGENCY MEDICINE
Payer: COMMERCIAL

## 2024-05-02 ENCOUNTER — APPOINTMENT (OUTPATIENT)
Dept: CT IMAGING | Facility: HOSPITAL | Age: 39
End: 2024-05-02
Payer: COMMERCIAL

## 2024-05-02 DIAGNOSIS — R10.32 LEFT LOWER QUADRANT ABDOMINAL PAIN: Primary | ICD-10-CM

## 2024-05-02 LAB
ALBUMIN SERPL-MCNC: 4.6 G/DL (ref 3.5–5.2)
ALBUMIN/GLOB SERPL: 1.5 G/DL
ALP SERPL-CCNC: 55 U/L (ref 39–117)
ALT SERPL W P-5'-P-CCNC: 17 U/L (ref 1–33)
ANION GAP SERPL CALCULATED.3IONS-SCNC: 12.1 MMOL/L (ref 5–15)
AST SERPL-CCNC: 19 U/L (ref 1–32)
BASOPHILS # BLD AUTO: 0.03 10*3/MM3 (ref 0–0.2)
BASOPHILS NFR BLD AUTO: 0.3 % (ref 0–1.5)
BILIRUB SERPL-MCNC: 0.2 MG/DL (ref 0–1.2)
BILIRUB UR QL STRIP: NEGATIVE
BUN SERPL-MCNC: 9 MG/DL (ref 6–20)
BUN/CREAT SERPL: 10.6 (ref 7–25)
CALCIUM SPEC-SCNC: 10.1 MG/DL (ref 8.6–10.5)
CHLORIDE SERPL-SCNC: 104 MMOL/L (ref 98–107)
CLARITY UR: ABNORMAL
CO2 SERPL-SCNC: 25.9 MMOL/L (ref 22–29)
COLOR UR: YELLOW
CREAT SERPL-MCNC: 0.85 MG/DL (ref 0.57–1)
DEPRECATED RDW RBC AUTO: 47.3 FL (ref 37–54)
EGFRCR SERPLBLD CKD-EPI 2021: 88.9 ML/MIN/1.73
EOSINOPHIL # BLD AUTO: 0.06 10*3/MM3 (ref 0–0.4)
EOSINOPHIL NFR BLD AUTO: 0.6 % (ref 0.3–6.2)
ERYTHROCYTE [DISTWIDTH] IN BLOOD BY AUTOMATED COUNT: 14.9 % (ref 12.3–15.4)
GLOBULIN UR ELPH-MCNC: 3.1 GM/DL
GLUCOSE SERPL-MCNC: 79 MG/DL (ref 65–99)
GLUCOSE UR STRIP-MCNC: NEGATIVE MG/DL
HCG INTACT+B SERPL-ACNC: <0.5 MIU/ML
HCT VFR BLD AUTO: 44.1 % (ref 34–46.6)
HGB BLD-MCNC: 14.8 G/DL (ref 12–15.9)
HGB UR QL STRIP.AUTO: NEGATIVE
HOLD SPECIMEN: NORMAL
HOLD SPECIMEN: NORMAL
IMM GRANULOCYTES # BLD AUTO: 0.03 10*3/MM3 (ref 0–0.05)
IMM GRANULOCYTES NFR BLD AUTO: 0.3 % (ref 0–0.5)
KETONES UR QL STRIP: ABNORMAL
LEUKOCYTE ESTERASE UR QL STRIP.AUTO: NEGATIVE
LIPASE SERPL-CCNC: 37 U/L (ref 13–60)
LYMPHOCYTES # BLD AUTO: 4.16 10*3/MM3 (ref 0.7–3.1)
LYMPHOCYTES NFR BLD AUTO: 42.2 % (ref 19.6–45.3)
MCH RBC QN AUTO: 29.4 PG (ref 26.6–33)
MCHC RBC AUTO-ENTMCNC: 33.6 G/DL (ref 31.5–35.7)
MCV RBC AUTO: 87.7 FL (ref 79–97)
MONOCYTES # BLD AUTO: 0.69 10*3/MM3 (ref 0.1–0.9)
MONOCYTES NFR BLD AUTO: 7 % (ref 5–12)
NEUTROPHILS NFR BLD AUTO: 4.88 10*3/MM3 (ref 1.7–7)
NEUTROPHILS NFR BLD AUTO: 49.6 % (ref 42.7–76)
NITRITE UR QL STRIP: NEGATIVE
NRBC BLD AUTO-RTO: 0 /100 WBC (ref 0–0.2)
PH UR STRIP.AUTO: 6 [PH] (ref 5–8)
PLATELET # BLD AUTO: 353 10*3/MM3 (ref 140–450)
PMV BLD AUTO: 9.4 FL (ref 6–12)
POTASSIUM SERPL-SCNC: 3.7 MMOL/L (ref 3.5–5.2)
PROT SERPL-MCNC: 7.7 G/DL (ref 6–8.5)
PROT UR QL STRIP: ABNORMAL
RBC # BLD AUTO: 5.03 10*6/MM3 (ref 3.77–5.28)
SODIUM SERPL-SCNC: 142 MMOL/L (ref 136–145)
SP GR UR STRIP: >=1.03 (ref 1–1.03)
UROBILINOGEN UR QL STRIP: ABNORMAL
WBC NRBC COR # BLD AUTO: 9.85 10*3/MM3 (ref 3.4–10.8)
WHOLE BLOOD HOLD COAG: NORMAL
WHOLE BLOOD HOLD SPECIMEN: NORMAL

## 2024-05-02 PROCEDURE — 74177 CT ABD & PELVIS W/CONTRAST: CPT

## 2024-05-02 PROCEDURE — 99285 EMERGENCY DEPT VISIT HI MDM: CPT

## 2024-05-02 PROCEDURE — 85025 COMPLETE CBC W/AUTO DIFF WBC: CPT

## 2024-05-02 PROCEDURE — 81003 URINALYSIS AUTO W/O SCOPE: CPT

## 2024-05-02 PROCEDURE — 84702 CHORIONIC GONADOTROPIN TEST: CPT

## 2024-05-02 PROCEDURE — 80053 COMPREHEN METABOLIC PANEL: CPT

## 2024-05-02 PROCEDURE — 83690 ASSAY OF LIPASE: CPT

## 2024-05-02 PROCEDURE — 25510000001 IOPAMIDOL PER 1 ML

## 2024-05-02 RX ORDER — SODIUM CHLORIDE 0.9 % (FLUSH) 0.9 %
10 SYRINGE (ML) INJECTION AS NEEDED
Status: DISCONTINUED | OUTPATIENT
Start: 2024-05-02 | End: 2024-05-03 | Stop reason: HOSPADM

## 2024-05-02 RX ADMIN — IOPAMIDOL 100 ML: 755 INJECTION, SOLUTION INTRAVENOUS at 22:21

## 2024-05-03 VITALS
SYSTOLIC BLOOD PRESSURE: 128 MMHG | DIASTOLIC BLOOD PRESSURE: 80 MMHG | WEIGHT: 181.66 LBS | TEMPERATURE: 98.5 F | HEIGHT: 65 IN | BODY MASS INDEX: 30.27 KG/M2 | RESPIRATION RATE: 20 BRPM | HEART RATE: 85 BPM | OXYGEN SATURATION: 99 %

## 2024-05-03 NOTE — ED PROVIDER NOTES
Time: 9:04 PM EDT  Date of encounter:  2024  Independent Historian/Clinical History and Information was obtained by:   Patient    History is limited by: N/A    Chief Complaint: Left lower quadrant pain      History of Present Illness:  Patient is a 38 y.o. year old female who presents to the emergency department for evaluation of left lower quadrant pain.  Patient was seen in urgent care today and instructed to come to ED if pain persisted or got worse.  She has been experiencing pain since November or longer and has been seen by various different providers.  The patient states that the pain has existed at least since November.  She states that she gets it approximately every other month..  She states it is localized in the left lower quadrant with occasional radiation to the left flank.  She describes as crampy in nature.  The patient has no associated nausea or vomiting.  The patient does note that she has intermittent constipation and diarrhea.  The patient does note that she has been diagnosed with irritable bowel syndrome.  Patient denies any urinary frequency, urgency or dysuria or gross hematuria.  The patient states that she has checked her MyChart for the last several months and they have noted blood in the urine.  I discussed this with her.  The patient cannot recall whether she was on her period when she did give urine samples.  Her last period ended on Monday.  It was a normal cycle for her.  The patient is not sexually active and has no vaginal discharge or vaginal complaints      HPI    Patient Care Team  Primary Care Provider: Kajal Hillman APRN    Past Medical History:     No Known Allergies  Past Medical History:   Diagnosis Date    Abnormal Pap smear of cervix     Gestational hypertension     Ovarian cyst     Vitamin D deficiency      Past Surgical History:   Procedure Laterality Date     SECTION      COLONOSCOPY  2018        TONSILLECTOMY      UPPER GASTROINTESTINAL  ENDOSCOPY  02/28/2018    Normal     Family History   Problem Relation Age of Onset    Hypertension Mother     Heart attack Mother     Lung cancer Father     Ovarian cancer Sister     Hypertension Sister     Vitamin D deficiency Sister     Breast cancer Cousin        Home Medications:  Prior to Admission medications    Medication Sig Start Date End Date Taking? Authorizing Provider   amitriptyline (ELAVIL) 25 MG tablet  7/19/21   Perfecto Orona MD   amLODIPine (NORVASC) 5 MG tablet  9/20/21   Perfecto Orona MD   atorvastatin (LIPITOR) 20 MG tablet  5/12/21   Emergency, Nurse Epic, RN   cyclobenzaprine (FLEXERIL) 10 MG tablet Take 1 tablet by mouth 3 (Three) Times a Day As Needed for Muscle Spasms. 3/15/24   Jonathon Narvaez MD   desipramine (NOPRAMIN) 10 MG tablet TAKE 1 TABLET BY MOUTH DAILY 3/25/24   Ashley Parmar APRN   Drysol 20 % external solution  5/20/21   Emergency, Nurse Breanna RN   ergocalciferol (ERGOCALCIFEROL) 1.25 MG (95570 UT) capsule ergocalciferol (vitamin D2) 1,250 mcg (50,000 unit) capsule    Emergency, Nurse Breanna RN   escitalopram (LEXAPRO) 10 MG tablet  8/17/21   Perfecto Orona MD   HYDROcodone-acetaminophen (NORCO) 7.5-325 MG per tablet  11/14/22   Perfecto Orona MD   naproxen (EC NAPROSYN) 500 MG EC tablet Take 1 tablet by mouth 2 (Two) Times a Day With Meals. 3/15/24   Jonathon Narvaez MD   tiZANidine (ZANAFLEX) 4 MG tablet Every 12 (Twelve) Hours.    ProviderPerfecto MD        Social History:   Social History     Tobacco Use    Smoking status: Every Day     Current packs/day: 0.25     Types: Cigarettes    Smokeless tobacco: Never    Tobacco comments:     2 PPD   Vaping Use    Vaping status: Never Used   Substance Use Topics    Alcohol use: No    Drug use: No         Review of Systems:  Review of Systems   Constitutional:  Negative for chills, diaphoresis and fever.   HENT:  Negative for congestion, postnasal drip, rhinorrhea and sore throat.    Eyes:  Negative  "for photophobia.   Respiratory:  Negative for cough, chest tightness and shortness of breath.    Cardiovascular:  Negative for chest pain, palpitations and leg swelling.   Gastrointestinal:  Positive for abdominal pain, constipation and diarrhea. Negative for nausea and vomiting.   Genitourinary:  Positive for flank pain. Negative for difficulty urinating, dysuria, frequency, hematuria and urgency.   Musculoskeletal:  Negative for back pain, neck pain and neck stiffness.   Skin:  Negative for pallor and rash.   Neurological:  Negative for dizziness, syncope, weakness, numbness and headaches.   Hematological:  Negative for adenopathy. Does not bruise/bleed easily.   Psychiatric/Behavioral: Negative.          Physical Exam:  /84 (BP Location: Left arm, Patient Position: Sitting)   Pulse 90   Temp 98.7 °F (37.1 °C) (Oral)   Resp 18   Ht 165.1 cm (65\")   Wt 82.4 kg (181 lb 10.5 oz)   LMP 04/23/2024 (Exact Date)   SpO2 99%   BMI 30.23 kg/m²     Physical Exam  Vitals and nursing note reviewed.   Constitutional:       General: She is not in acute distress.     Appearance: Normal appearance. She is not ill-appearing, toxic-appearing or diaphoretic.   HENT:      Head: Normocephalic and atraumatic.      Mouth/Throat:      Mouth: Mucous membranes are moist.   Eyes:      Pupils: Pupils are equal, round, and reactive to light.   Cardiovascular:      Rate and Rhythm: Normal rate and regular rhythm.      Pulses: Normal pulses.           Carotid pulses are 2+ on the right side and 2+ on the left side.       Radial pulses are 2+ on the right side and 2+ on the left side.        Femoral pulses are 2+ on the right side and 2+ on the left side.       Popliteal pulses are 2+ on the right side and 2+ on the left side.        Dorsalis pedis pulses are 2+ on the right side and 2+ on the left side.        Posterior tibial pulses are 2+ on the right side and 2+ on the left side.      Heart sounds: Normal heart sounds. No murmur " heard.  Pulmonary:      Effort: Pulmonary effort is normal. No accessory muscle usage, respiratory distress or retractions.      Breath sounds: Normal breath sounds. No wheezing, rhonchi or rales.   Abdominal:      General: Abdomen is flat. There is no distension.      Palpations: Abdomen is soft. There is no mass or pulsatile mass.      Tenderness: There is no abdominal tenderness. There is no right CVA tenderness, left CVA tenderness, guarding or rebound.      Comments: No rigidity   Musculoskeletal:         General: No swelling, tenderness or deformity.      Cervical back: Neck supple. No tenderness.      Right lower leg: No edema.      Left lower leg: No edema.   Skin:     General: Skin is warm and dry.      Capillary Refill: Capillary refill takes less than 2 seconds.      Coloration: Skin is not jaundiced or pale.      Findings: No erythema.   Neurological:      General: No focal deficit present.      Mental Status: She is alert and oriented to person, place, and time. Mental status is at baseline.      Cranial Nerves: Cranial nerves 2-12 are intact. No cranial nerve deficit.      Sensory: Sensation is intact. No sensory deficit.      Motor: Motor function is intact. No weakness or pronator drift.      Coordination: Coordination is intact. Coordination normal.   Psychiatric:         Mood and Affect: Mood normal.         Behavior: Behavior normal.                Procedures:  Procedures      Medical Decision Making:      Comorbidities that affect care:    Irritable bowel syndrome, ovarian cyst    External Notes reviewed:    None      The following orders were placed and all results were independently analyzed by me:  Orders Placed This Encounter   Procedures    CT Abdomen Pelvis With Contrast    [place] Draw    Comprehensive Metabolic Panel    Lipase    Urinalysis With Microscopic If Indicated (No Culture) - Urine, Clean Catch    hCG, Quantitative, Pregnancy    CBC Auto Differential    NPO Diet NPO Type: Strict  NPO    Undress & Gown    Insert Peripheral IV    CBC & Differential    Green Top (Gel)    Lavender Top    Gold Top - SST    Light Blue Top       Medications Given in the Emergency Department:  Medications   sodium chloride 0.9 % flush 10 mL (has no administration in time range)   iopamidol (ISOVUE-370) 76 % injection 100 mL (100 mL Intravenous Given 5/2/24 2221)        ED Course:    ED Course as of 05/02/24 2353   Thu May 02, 2024   2102 -- PROVIDER IN TRIAGE NOTE ---    The patient was evaluated by Martha valverde in triage. Orders were placed and the patient is currently awaiting disposition.    [CB]      ED Course User Index  [CB] Martha Alexander APRN       Labs:    Lab Results (last 24 hours)       Procedure Component Value Units Date/Time    CBC & Differential [[id number]]  (Abnormal) Collected: 05/02/24 2115    Specimen: Blood Updated: 05/02/24 2129    Narrative:      The following orders were created for panel order CBC & Differential.  Procedure                               Abnormality         Status                     ---------                               -----------         ------                     CBC Auto Differential[[id number]]        Abnormal            Final result                 Please view results for these tests on the individual orders.    Comprehensive Metabolic Panel [[id number]] Collected: 05/02/24 2115    Specimen: Blood Updated: 05/02/24 2155     Glucose 79 mg/dL      BUN 9 mg/dL      Creatinine 0.85 mg/dL      Sodium 142 mmol/L      Potassium 3.7 mmol/L      Chloride 104 mmol/L      CO2 25.9 mmol/L      Calcium 10.1 mg/dL      Total Protein 7.7 g/dL      Albumin 4.6 g/dL      ALT (SGPT) 17 U/L      AST (SGOT) 19 U/L      Alkaline Phosphatase 55 U/L      Total Bilirubin 0.2 mg/dL      Globulin 3.1 gm/dL      A/G Ratio 1.5 g/dL      BUN/Creatinine Ratio 10.6     Anion Gap 12.1 mmol/L      eGFR 88.9 mL/min/1.73     Narrative:      GFR Normal >60  Chronic Kidney Disease <60  Kidney  Failure <15      Lipase [[id number]]  (Normal) Collected: 05/02/24 2115    Specimen: Blood Updated: 05/02/24 2155     Lipase 37 U/L     hCG, Quantitative, Pregnancy [[id number]] Collected: 05/02/24 2115    Specimen: Blood Updated: 05/02/24 2152     HCG Quantitative <0.50 mIU/mL     Narrative:      HCG Ranges by Gestational Age    Females - non-pregnant premenopausal   </= 1mIU/mL HCG  Females - postmenopausal               </= 7mIU/mL HCG    3 Weeks       5.4   -      72 mIU/mL  4 Weeks      10.2   -     708 mIU/mL  5 Weeks       217   -   8,245 mIU/mL  6 Weeks       152   -  32,177 mIU/mL  7 Weeks     4,059   - 153,767 mIU/mL  8 Weeks    31,366   - 149,094 mIU/mL  9 Weeks    59,109   - 135,901 mIU/mL  10 Weeks   44,186   - 170,409 mIU/mL  12 Weeks   27,107   - 201,615 mIU/mL  14 Weeks   24,302   -  93,646 mIU/mL  15 Weeks   12,540   -  69,747 mIU/mL  16 Weeks    8,904   -  55,332 mIU/mL  17 Weeks    8,240   -  51,793 mIU/mL  18 Weeks    9,649   -  55,271 mIU/mL      CBC Auto Differential [[id number]]  (Abnormal) Collected: 05/02/24 2115    Specimen: Blood Updated: 05/02/24 2129     WBC 9.85 10*3/mm3      RBC 5.03 10*6/mm3      Hemoglobin 14.8 g/dL      Hematocrit 44.1 %      MCV 87.7 fL      MCH 29.4 pg      MCHC 33.6 g/dL      RDW 14.9 %      RDW-SD 47.3 fl      MPV 9.4 fL      Platelets 353 10*3/mm3      Neutrophil % 49.6 %      Lymphocyte % 42.2 %      Monocyte % 7.0 %      Eosinophil % 0.6 %      Basophil % 0.3 %      Immature Grans % 0.3 %      Neutrophils, Absolute 4.88 10*3/mm3      Lymphocytes, Absolute 4.16 10*3/mm3      Monocytes, Absolute 0.69 10*3/mm3      Eosinophils, Absolute 0.06 10*3/mm3      Basophils, Absolute 0.03 10*3/mm3      Immature Grans, Absolute 0.03 10*3/mm3      nRBC 0.0 /100 WBC     Urinalysis With Microscopic If Indicated (No Culture) - Urine, Clean Catch [[id number]]  (Abnormal) Collected: 05/02/24 2145    Specimen: Urine, Clean Catch Updated: 05/02/24 2152     Color, UA Yellow      Appearance, UA Cloudy     pH, UA 6.0     Specific Gravity, UA >=1.030     Glucose, UA Negative     Ketones, UA Trace     Bilirubin, UA Negative     Blood, UA Negative     Protein, UA Trace     Leuk Esterase, UA Negative     Nitrite, UA Negative     Urobilinogen, UA 1.0 E.U./dL    Narrative:      Urine microscopic not indicated.             Imaging:    CT Abdomen Pelvis With Contrast    Result Date: 5/2/2024  CT ABDOMEN PELVIS W CONTRAST-  Date of Exam: 5/2/2024 10:17 PM  Indication: LLQ pain.  Comparison: None available.  Technique: Axial CT images were obtained of the abdomen and pelvis following the uneventful intravenous administration of nonionic contrast. Reconstructed coronal and sagittal images were also obtained. Automated exposure control and iterative construction methods were used.   Findings: ABDOMEN: The lung bases are clear. There are heterogeneous areas of decreased attenuation in the right hepatic lobe. There are no inflammatory changes around the gallbladder. The spleen, pancreas, adrenal glands and kidneys are normal.  PELVIS: The uterus and adnexa have a normal CT appearance. There is a physiologic amount of pelvic free fluid. No evidence of bowel obstruction, perforation or abscess. The appendix and terminal ileum are normal. No CT evidence of colitis or diverticulitis. Advanced degenerative changes are present in the left hip. There are moderate degenerative changes in the right hip.      Impression:  1. Areas of heterogeneous decreased attenuation in the right hepatic lobe possibly secondary to early phase contrast enhancement artifact. Suggest a routine follow-up MRI of the abdomen with and without contrast to exclude an infiltrative process.  2. Moderate degenerative changes in the right hip. Advanced degenerative changes in the left hip.    Electronically Signed By-VALORIE APONTE MD On:5/2/2024 10:52 PM         Differential Diagnosis and Discussion:    Abdominal Pain: Based on the patient's  signs and symptoms, I considered abdominal aortic aneurysm, small bowel obstruction, pancreatitis, acute cholecystitis, acute appendecitis, peptic ulcer disease, gastritis, colitis, endocrine disorders, irritable bowel syndrome and other differential diagnosis an etiology of the patient's abdominal pain.    All labs were reviewed and interpreted by me.  CT scan radiology impression was interpreted by me.    MDM  Number of Diagnoses or Management Options  Left lower quadrant abdominal pain  Diagnosis management comments: The patient's urinalysis was negative for obvious infection or blood    The patient's CBC was reviewed and shows no abnormalities of critical concern.  Of note, there is no anemia requiring a blood transfusion and the platelet count is acceptable    The patient's CMP was reviewed and shows no abnormalities of critical concern.  Of note, the patient's sodium and potassium are acceptable.  The patient's liver enzymes are unremarkable.  The patient's renal function including creatinine is preserved.  The patient has a normal anion gap.    The patient hCG quant was negative for pregnancy    CT scan of the abdomen pelvis IV contrast was unremarkable.  The slight abnormality in the liver which thought was related to possible delayed IV contrast.  The radiologist is recommending a follow-up MRI with and without contrast on an outpatient basis.  I have discussed this in detail with the patient.  The patient will follow-up with her primary care physician to review the CT and discuss possible need for MRI with and without contrast of the abdomen to specifically look at the liver    The patient is resting comfortably and feels better, is alert and in no distress.  Repeat examination is unremarkable and benign; in particular, there is no discomfort at McBurney's point and there is no pulsatile mass.  The history, exam, diagnostic testing and current condition does not suggest acute appendicitis, bowel  obstruction, acute cholecystitis bowel perforation, major gastrointestinal bleeding, severe diverticulitis, abdominal aortic aneurysm, mesenteric ischemia, volvulus, sepsis or other significant pathology that warrants further testing, continued ED treatment, admission for surgical evaluation at this point.  The vital signs have been stable.  The patient does not have uncontrolled pain intractable vomiting or other significant symptoms.  The patient's condition is stable and appropriate for discharge from the emergency department.  The patient will follow up with her primary care physician for serial reexamination of the abdomen tomorrow.  The patient was instructed to return should they have worsening pain, intractable vomiting, fever or new symptoms       Amount and/or Complexity of Data Reviewed  Clinical lab tests: reviewed  Tests in the radiology section of CPT®: reviewed  Tests in the medicine section of CPT®: reviewed         Social Determinants of Health:    Patient is independent, reliable, and has access to care.       Disposition and Care Coordination:    Discharged: The patient is suitable and stable for discharge with no need for consideration of admission.    I have explained discharge medications and the need for follow up with the patient/caretakers. This was also printed in the discharge instructions. Patient was discharged with the following medications and follow up:      Medication List      No changes were made to your prescriptions during this visit.      Kajal Hillman N, APRN  [address] [facility] [address]  [phone]    On 5/3/2024  Serial reexamination of the abdomen       Final diagnoses:   Left lower quadrant abdominal pain        ED Disposition       ED Disposition   Discharge    Condition   Stable    Comment   --               This medical record created using voice recognition software.             Avila Glass DO  05/02/24 [id number]

## 2024-05-03 NOTE — DISCHARGE INSTRUCTIONS
Please follow-up with your doctor tomorrow for serial reexamination the abdomen.  Return to the emergency room immediately for intractable pain, intractable vomiting, fever, shaking chills, muscle aches, near passing out, passing out or any new symptoms you are concerned with    Please discuss the possible need for gastroenterology referral with your primary care physician    Please review the CT scan of the abdomen and pelvis with IV contrast that was performed today with your primary care physician.      Please discuss the possible need to perform an MRI with and without contrast of the abdomen on an outpatient basis due to the abnormality found in the liver as recommended by the radiologist with your primary care physician

## 2024-05-03 NOTE — ED TRIAGE NOTES
Went to UC tonight, ordered imaging (CT outpatient) but told if worse to come to ER.  Having abdominal pain, LLQ that radiates to back.  Denies nausea, vomiting

## 2024-09-10 ENCOUNTER — OFFICE VISIT (OUTPATIENT)
Dept: OBSTETRICS AND GYNECOLOGY | Facility: CLINIC | Age: 39
End: 2024-09-10
Payer: COMMERCIAL

## 2024-09-10 VITALS
WEIGHT: 181 LBS | SYSTOLIC BLOOD PRESSURE: 154 MMHG | HEIGHT: 65 IN | DIASTOLIC BLOOD PRESSURE: 94 MMHG | BODY MASS INDEX: 30.16 KG/M2

## 2024-09-10 DIAGNOSIS — I10 ESSENTIAL HYPERTENSION: ICD-10-CM

## 2024-09-10 DIAGNOSIS — Z11.3 SCREENING FOR STD (SEXUALLY TRANSMITTED DISEASE): ICD-10-CM

## 2024-09-10 DIAGNOSIS — Z30.8 ENCOUNTER FOR OTHER CONTRACEPTIVE MANAGEMENT: ICD-10-CM

## 2024-09-10 DIAGNOSIS — N89.8 VAGINAL ITCHING: Primary | ICD-10-CM

## 2024-09-10 LAB
B-HCG UR QL: NEGATIVE
EXPIRATION DATE: NORMAL
INTERNAL NEGATIVE CONTROL: NEGATIVE
INTERNAL POSITIVE CONTROL: POSITIVE
Lab: NORMAL

## 2024-09-10 PROCEDURE — 1160F RVW MEDS BY RX/DR IN RCRD: CPT | Performed by: NURSE PRACTITIONER

## 2024-09-10 PROCEDURE — 1159F MED LIST DOCD IN RCRD: CPT | Performed by: NURSE PRACTITIONER

## 2024-09-10 PROCEDURE — 81025 URINE PREGNANCY TEST: CPT | Performed by: NURSE PRACTITIONER

## 2024-09-10 PROCEDURE — 99214 OFFICE O/P EST MOD 30 MIN: CPT | Performed by: NURSE PRACTITIONER

## 2024-09-10 RX ORDER — VARENICLINE TARTRATE 0.5 (11)-1
KIT ORAL
COMMUNITY
Start: 2024-08-20 | End: 2024-09-16

## 2024-09-10 RX ORDER — CHLORHEXIDINE GLUCONATE ORAL RINSE 1.2 MG/ML
SOLUTION DENTAL
COMMUNITY

## 2024-09-10 RX ORDER — VARENICLINE TARTRATE 1 MG/1
1 TABLET, FILM COATED ORAL
COMMUNITY
Start: 2024-08-20 | End: 2025-02-16

## 2024-09-10 RX ORDER — ACETAMINOPHEN AND CODEINE PHOSPHATE 120; 12 MG/5ML; MG/5ML
1 SOLUTION ORAL DAILY
Qty: 91 TABLET | Refills: 3 | Status: SHIPPED | OUTPATIENT
Start: 2024-09-10

## 2024-09-10 NOTE — PROGRESS NOTES
"Chief Complaint   Patient presents with    Follow-up     Pt here today for STD testing, would like to discuss BC        SUBJECTIVE:     Cristina Magana is a 39 y.o.  who presents with c/o vaginal itching/irritation for a few weeks. Denies vaginal discharge or odor. She has changed her soaps. She is not using douches. She has a new partner, partner has another partner. Denies pelvic pain or dysuria. Denies fevers, chills, body aches, or N&V. This is a new problem. LMP 24. She would like STD testing today.     Would like to discuss contraceptive options. Previously used DMPA X 5 years for menorrhagia. Last dose she thinks was in 2023. She would like to start contraceptive patch.  Denies history of migraine with aura, denies history of DVT, there is no family history of DVT. She is an every day smoker.     She would like mammogram ordered  Past Medical History:   Diagnosis Date    Abnormal Pap smear of cervix     Gestational hypertension     Ovarian cyst     Vitamin D deficiency       Past Surgical History:   Procedure Laterality Date     SECTION      COLONOSCOPY  2018    [facility]    TONSILLECTOMY      UPPER GASTROINTESTINAL ENDOSCOPY  2018    Normal        Review of Systems   Constitutional:  Negative for chills, fatigue and fever.   Genitourinary:  Negative for dyspareunia, dysuria, menstrual problem, pelvic pain, vaginal bleeding, vaginal discharge and vaginal pain.        + vaginal itching/irritation       OBJECTIVE:   Vitals:    09/10/24 1121   BP: 154/94   Weight: 82.1 kg (181 lb)   Height: 165.1 cm (65\")        Physical Exam  Constitutional:       General: She is not in acute distress.     Appearance: Normal appearance. She is not ill-appearing, toxic-appearing or diaphoretic.   Genitourinary:      Bladder and urethral meatus normal.      No lesions in the vagina.      Right Labia: No rash, tenderness, lesions, skin changes or Bartholin's cyst.     Left Labia: No " tenderness, lesions, skin changes, Bartholin's cyst or rash.     No labial fusion noted.      No inguinal adenopathy present in the right or left side.     No vaginal discharge, erythema, tenderness, bleeding, ulceration or granulation tissue.      No vaginal prolapse present.     No vaginal atrophy present.       Right Adnexa: not tender, not full, not palpable, no mass present and not absent.     Left Adnexa: not tender, not full, not palpable, no mass present and not absent.     No cervical motion tenderness, discharge, friability, lesion, polyp, nabothian cyst or eversion.      Uterus is not enlarged, fixed, tender, irregular or prolapsed.      No uterine mass detected.  Cardiovascular:      Rate and Rhythm: Normal rate.   Pulmonary:      Effort: Pulmonary effort is normal.   Abdominal:      General: There is no distension.      Palpations: Abdomen is soft. There is no mass.      Tenderness: There is no abdominal tenderness. There is no guarding.      Hernia: No hernia is present. There is no hernia in the left inguinal area or right inguinal area.   Musculoskeletal:         General: Normal range of motion.      Cervical back: Normal range of motion.   Lymphadenopathy:      Lower Body: No right inguinal adenopathy. No left inguinal adenopathy.   Neurological:      General: No focal deficit present.      Mental Status: She is alert and oriented to person, place, and time.   Skin:     General: Skin is warm and dry.   Vitals and nursing note reviewed.       Assessment/Plan    Diagnoses and all orders for this visit:    1. Vaginal itching (Primary)  -     NuSwab VG+ - Swab, Vagina    2. Screening for STD (sexually transmitted disease)  -     Nuab VG+ - Swab, Vagina  -     HIV-1 / O / 2 Ag / Antibody  -     RPR, Rfx Qn RPR / Confirm TP  -     Hepatitis B Surface Antigen  -     Hepatitis C Antibody    3. Encounter for other contraceptive management  -     norethindrone (MICRONOR) 0.35 MG tablet; Take 1 tablet by  mouth Daily.  Dispense: 91 tablet; Refill: 3  -     POC Pregnancy, Urine    4. Essential hypertension        Vaginal itching:  No abnormal discharge or erythema noted   Vaginal cultures obtained, will await results and treat if indicated  Encouraged condoms with IC, cotton only underwear, mild or no soaps, avoidance of douching  STD Screening:  Serum and vaginal cultures obtained  Contraceptive counseling:  Discussed contraception options at length including POPs,  injection, implant, and IUDs, recommend progesterone only products due to HTN and tobacco use.  The risks and benefits of the methods were discussed including but not limited to the increased risk of heart attack, blood clot, and stroke.  It was discussed the contraception does not protect against sexually transmitted infections and condoms are encouraged. The patient desires to start POP. Discussed start up, uses, side effects, risks vs benefits. Encouraged condoms for the first 3 weeks of use as back up.    HTN:  She is followed by PCP. Cristina stopped her medications to see if lifestyle changes would improve BP. We discussed familial component of HTN for some individuals. Discussed risks of uncontrolled HTN including but not limited to, eye damage, renal damage, CVD risks, damage to lower extremities. Encouraged to f/u with PCP for HTN    AE due in November, will complete mammogram that day as well.  Pt requested UPT today which was negative    Return in 4 weeks (on 10/8/2024), or if symptoms worsen or fail to improve, for Annual physical & Mammogram.    I spent 30 minutes caring for Cristina on this date of service. This time includes time spent by me in the following activities: preparing for the visit, reviewing tests, performing a medically appropriate examination and/or evaluation, counseling and educating the patient/family/caregiver, referring and communicating with other health care professionals, documenting information in the medical record,  independently interpreting results and communicating that information with the patient/family/caregiver, care coordination, ordering test(s), obtaining a separately obtained history, and reviewing a separately obtained history    SUSAN Agustin  9/10/2024  12:14 EDT

## 2024-09-12 LAB
A VAGINAE DNA VAG QL NAA+PROBE: ABNORMAL SCORE
BVAB2 DNA VAG QL NAA+PROBE: ABNORMAL SCORE
C ALBICANS DNA VAG QL NAA+PROBE: NEGATIVE
C GLABRATA DNA VAG QL NAA+PROBE: NEGATIVE
C TRACH DNA SPEC QL NAA+PROBE: NEGATIVE
MEGA1 DNA VAG QL NAA+PROBE: ABNORMAL SCORE
N GONORRHOEA DNA VAG QL NAA+PROBE: NEGATIVE
T VAGINALIS DNA VAG QL NAA+PROBE: NEGATIVE

## 2024-09-12 RX ORDER — METRONIDAZOLE 500 MG/1
500 TABLET ORAL 2 TIMES DAILY
Qty: 14 TABLET | Refills: 0 | Status: SHIPPED | OUTPATIENT
Start: 2024-09-12 | End: 2024-09-19

## 2024-10-18 ENCOUNTER — APPOINTMENT (OUTPATIENT)
Dept: CT IMAGING | Facility: HOSPITAL | Age: 39
End: 2024-10-18
Payer: COMMERCIAL

## 2024-10-18 ENCOUNTER — APPOINTMENT (OUTPATIENT)
Dept: GENERAL RADIOLOGY | Facility: HOSPITAL | Age: 39
End: 2024-10-18
Payer: COMMERCIAL

## 2024-10-18 ENCOUNTER — HOSPITAL ENCOUNTER (EMERGENCY)
Facility: HOSPITAL | Age: 39
Discharge: HOME | End: 2024-10-18
Attending: EMERGENCY MEDICINE
Payer: COMMERCIAL

## 2024-10-18 VITALS
RESPIRATION RATE: 16 BRPM | HEART RATE: 76 BPM | BODY MASS INDEX: 30.52 KG/M2 | DIASTOLIC BLOOD PRESSURE: 96 MMHG | TEMPERATURE: 98.6 F | SYSTOLIC BLOOD PRESSURE: 152 MMHG | WEIGHT: 183.2 LBS | OXYGEN SATURATION: 94 % | HEIGHT: 65 IN

## 2024-10-18 DIAGNOSIS — I10 ESSENTIAL HYPERTENSION: ICD-10-CM

## 2024-10-18 DIAGNOSIS — R51.9 ACUTE NONINTRACTABLE HEADACHE, UNSPECIFIED HEADACHE TYPE: Primary | ICD-10-CM

## 2024-10-18 LAB
ALBUMIN SERPL-MCNC: 4.6 G/DL (ref 3.5–5.2)
ALBUMIN/GLOB SERPL: 1.4 G/DL
ALP SERPL-CCNC: 52 U/L (ref 39–117)
ALT SERPL W P-5'-P-CCNC: 14 U/L (ref 1–33)
ANION GAP SERPL CALCULATED.3IONS-SCNC: 9.8 MMOL/L (ref 5–15)
AST SERPL-CCNC: 16 U/L (ref 1–32)
BASOPHILS # BLD AUTO: 0.02 10*3/MM3 (ref 0–0.2)
BASOPHILS NFR BLD AUTO: 0.3 % (ref 0–1.5)
BILIRUB SERPL-MCNC: 0.3 MG/DL (ref 0–1.2)
BUN SERPL-MCNC: 7 MG/DL (ref 6–20)
BUN/CREAT SERPL: 7.4 (ref 7–25)
CALCIUM SPEC-SCNC: 9.9 MG/DL (ref 8.6–10.5)
CHLORIDE SERPL-SCNC: 102 MMOL/L (ref 98–107)
CO2 SERPL-SCNC: 29.2 MMOL/L (ref 22–29)
CREAT SERPL-MCNC: 0.95 MG/DL (ref 0.57–1)
DEPRECATED RDW RBC AUTO: 49.1 FL (ref 37–54)
EGFRCR SERPLBLD CKD-EPI 2021: 77.4 ML/MIN/1.73
EOSINOPHIL # BLD AUTO: 0.07 10*3/MM3 (ref 0–0.4)
EOSINOPHIL NFR BLD AUTO: 0.9 % (ref 0.3–6.2)
ERYTHROCYTE [DISTWIDTH] IN BLOOD BY AUTOMATED COUNT: 14.6 % (ref 12.3–15.4)
GEN 5 2HR TROPONIN T REFLEX: <6 NG/L
GLOBULIN UR ELPH-MCNC: 3.4 GM/DL
GLUCOSE SERPL-MCNC: 75 MG/DL (ref 65–99)
HCT VFR BLD AUTO: 47.3 % (ref 34–46.6)
HGB BLD-MCNC: 15.2 G/DL (ref 12–15.9)
HOLD SPECIMEN: NORMAL
HOLD SPECIMEN: NORMAL
IMM GRANULOCYTES # BLD AUTO: 0.02 10*3/MM3 (ref 0–0.05)
IMM GRANULOCYTES NFR BLD AUTO: 0.3 % (ref 0–0.5)
LIPASE SERPL-CCNC: 152 U/L (ref 13–60)
LYMPHOCYTES # BLD AUTO: 2.6 10*3/MM3 (ref 0.7–3.1)
LYMPHOCYTES NFR BLD AUTO: 34.4 % (ref 19.6–45.3)
MAGNESIUM SERPL-MCNC: 2.4 MG/DL (ref 1.6–2.6)
MCH RBC QN AUTO: 29.3 PG (ref 26.6–33)
MCHC RBC AUTO-ENTMCNC: 32.1 G/DL (ref 31.5–35.7)
MCV RBC AUTO: 91.1 FL (ref 79–97)
MONOCYTES # BLD AUTO: 0.55 10*3/MM3 (ref 0.1–0.9)
MONOCYTES NFR BLD AUTO: 7.3 % (ref 5–12)
NEUTROPHILS NFR BLD AUTO: 4.29 10*3/MM3 (ref 1.7–7)
NEUTROPHILS NFR BLD AUTO: 56.8 % (ref 42.7–76)
NRBC BLD AUTO-RTO: 0 /100 WBC (ref 0–0.2)
NT-PROBNP SERPL-MCNC: <36 PG/ML (ref 0–450)
PLATELET # BLD AUTO: 285 10*3/MM3 (ref 140–450)
PMV BLD AUTO: 9.4 FL (ref 6–12)
POTASSIUM SERPL-SCNC: 3.8 MMOL/L (ref 3.5–5.2)
PROT SERPL-MCNC: 8 G/DL (ref 6–8.5)
QT INTERVAL: 366 MS
QTC INTERVAL: 440 MS
RBC # BLD AUTO: 5.19 10*6/MM3 (ref 3.77–5.28)
SODIUM SERPL-SCNC: 141 MMOL/L (ref 136–145)
TROPONIN T DELTA: NORMAL
TROPONIN T SERPL HS-MCNC: <6 NG/L
WBC NRBC COR # BLD AUTO: 7.55 10*3/MM3 (ref 3.4–10.8)
WHOLE BLOOD HOLD COAG: NORMAL
WHOLE BLOOD HOLD SPECIMEN: NORMAL

## 2024-10-18 PROCEDURE — 25810000003 SODIUM CHLORIDE 0.9 % SOLUTION: Performed by: NURSE PRACTITIONER

## 2024-10-18 PROCEDURE — 85025 COMPLETE CBC W/AUTO DIFF WBC: CPT

## 2024-10-18 PROCEDURE — 25010000002 DIPHENHYDRAMINE PER 50 MG: Performed by: NURSE PRACTITIONER

## 2024-10-18 PROCEDURE — 71045 X-RAY EXAM CHEST 1 VIEW: CPT

## 2024-10-18 PROCEDURE — 25010000002 DEXAMETHASONE SODIUM PHOSPHATE 10 MG/ML SOLUTION: Performed by: NURSE PRACTITIONER

## 2024-10-18 PROCEDURE — 80053 COMPREHEN METABOLIC PANEL: CPT

## 2024-10-18 PROCEDURE — 83880 ASSAY OF NATRIURETIC PEPTIDE: CPT

## 2024-10-18 PROCEDURE — 84484 ASSAY OF TROPONIN QUANT: CPT | Performed by: EMERGENCY MEDICINE

## 2024-10-18 PROCEDURE — 70450 CT HEAD/BRAIN W/O DYE: CPT

## 2024-10-18 PROCEDURE — 36415 COLL VENOUS BLD VENIPUNCTURE: CPT

## 2024-10-18 PROCEDURE — 93005 ELECTROCARDIOGRAM TRACING: CPT

## 2024-10-18 PROCEDURE — 93010 ELECTROCARDIOGRAM REPORT: CPT | Performed by: INTERNAL MEDICINE

## 2024-10-18 PROCEDURE — 96361 HYDRATE IV INFUSION ADD-ON: CPT

## 2024-10-18 PROCEDURE — 25010000002 ONDANSETRON PER 1 MG: Performed by: NURSE PRACTITIONER

## 2024-10-18 PROCEDURE — 84484 ASSAY OF TROPONIN QUANT: CPT

## 2024-10-18 PROCEDURE — 83690 ASSAY OF LIPASE: CPT

## 2024-10-18 PROCEDURE — 96374 THER/PROPH/DIAG INJ IV PUSH: CPT

## 2024-10-18 PROCEDURE — 96375 TX/PRO/DX INJ NEW DRUG ADDON: CPT

## 2024-10-18 PROCEDURE — 93005 ELECTROCARDIOGRAM TRACING: CPT | Performed by: EMERGENCY MEDICINE

## 2024-10-18 PROCEDURE — 25010000002 KETOROLAC TROMETHAMINE PER 15 MG: Performed by: NURSE PRACTITIONER

## 2024-10-18 PROCEDURE — 83735 ASSAY OF MAGNESIUM: CPT

## 2024-10-18 PROCEDURE — 99284 EMERGENCY DEPT VISIT MOD MDM: CPT

## 2024-10-18 RX ORDER — BUTALBITAL, ACETAMINOPHEN AND CAFFEINE 300; 40; 50 MG/1; MG/1; MG/1
1 CAPSULE ORAL ONCE
Status: COMPLETED | OUTPATIENT
Start: 2024-10-18 | End: 2024-10-18

## 2024-10-18 RX ORDER — SODIUM CHLORIDE 0.9 % (FLUSH) 0.9 %
10 SYRINGE (ML) INJECTION AS NEEDED
Status: DISCONTINUED | OUTPATIENT
Start: 2024-10-18 | End: 2024-10-18 | Stop reason: HOSPADM

## 2024-10-18 RX ORDER — ASPIRIN 81 MG/1
324 TABLET, CHEWABLE ORAL ONCE
Status: COMPLETED | OUTPATIENT
Start: 2024-10-18 | End: 2024-10-18

## 2024-10-18 RX ORDER — KETOROLAC TROMETHAMINE 15 MG/ML
15 INJECTION, SOLUTION INTRAMUSCULAR; INTRAVENOUS ONCE
Status: COMPLETED | OUTPATIENT
Start: 2024-10-18 | End: 2024-10-18

## 2024-10-18 RX ORDER — DIPHENHYDRAMINE HYDROCHLORIDE 50 MG/ML
25 INJECTION INTRAMUSCULAR; INTRAVENOUS ONCE
Status: COMPLETED | OUTPATIENT
Start: 2024-10-18 | End: 2024-10-18

## 2024-10-18 RX ORDER — DEXAMETHASONE SODIUM PHOSPHATE 10 MG/ML
6 INJECTION, SOLUTION INTRAMUSCULAR; INTRAVENOUS ONCE
Status: COMPLETED | OUTPATIENT
Start: 2024-10-18 | End: 2024-10-18

## 2024-10-18 RX ORDER — ONDANSETRON 2 MG/ML
4 INJECTION INTRAMUSCULAR; INTRAVENOUS ONCE
Status: COMPLETED | OUTPATIENT
Start: 2024-10-18 | End: 2024-10-18

## 2024-10-18 RX ADMIN — DIPHENHYDRAMINE HYDROCHLORIDE 25 MG: 50 INJECTION, SOLUTION INTRAMUSCULAR; INTRAVENOUS at 15:47

## 2024-10-18 RX ADMIN — BUTALBITAL, ACETAMINOPHEN AND CAFFEINE 1 CAPSULE: 300; 40; 50 CAPSULE ORAL at 16:28

## 2024-10-18 RX ADMIN — DEXAMETHASONE SODIUM PHOSPHATE 6 MG: 10 INJECTION INTRAMUSCULAR; INTRAVENOUS at 15:46

## 2024-10-18 RX ADMIN — ONDANSETRON 4 MG: 2 INJECTION INTRAMUSCULAR; INTRAVENOUS at 15:47

## 2024-10-18 RX ADMIN — SODIUM CHLORIDE 1000 ML: 0.9 INJECTION, SOLUTION INTRAVENOUS at 15:49

## 2024-10-18 RX ADMIN — KETOROLAC TROMETHAMINE 15 MG: 15 INJECTION, SOLUTION INTRAMUSCULAR; INTRAVENOUS at 15:45

## 2024-10-18 RX ADMIN — ASPIRIN 81 MG 324 MG: 81 TABLET ORAL at 15:12

## 2024-10-18 NOTE — DISCHARGE INSTRUCTIONS
Rest, drink plenty of fluids and continue to take your prescribed amlodipine  Follow up with your PCP in 3 days  Return to the ER for chest pain, shortness of breath, unilateral weakness, dizziness, syncope/near syncope, difficulty talking/walking or any other worsening or new symptoms of concern

## 2024-10-18 NOTE — ED PROVIDER NOTES
Time: 3:08 PM EDT  Date of encounter:  10/18/2024  Independent Historian/Clinical History and Information was obtained by:   Patient    History is limited by: N/A    Chief Complaint: headache, elevated B/P      History of Present Illness:  Patient is a 39 y.o. year old female who presents to the emergency department for evaluation of headache and elevated B/P for 3 days. Seen at  yesterday, given toradol/benadryl/zofran. B/P on arrival 137/93. States she has been off of her amlodipine for several months but started back on it 3 days ago when she started having a headache and noticed her blood pressure was getting higher than usual. She denies visual changes but she is photophobic and nauseated due to the headache. She denies chest pain or shortness of breath.      Patient Care Team  Primary Care Provider: Kajal Hillman APRN    Past Medical History:     No Known Allergies  Past Medical History:   Diagnosis Date    Abnormal Pap smear of cervix     Gestational hypertension     Ovarian cyst     Vitamin D deficiency      Past Surgical History:   Procedure Laterality Date     SECTION      COLONOSCOPY  2018    [facility]    TONSILLECTOMY      UPPER GASTROINTESTINAL ENDOSCOPY  2018    Normal     Family History   Problem Relation Age of Onset    Hypertension Mother     Heart attack Mother     Lung cancer Father     Ovarian cancer Sister     Hypertension Sister     Vitamin D deficiency Sister     Breast cancer Cousin        Home Medications:  Prior to Admission medications    Medication Sig Start Date End Date Taking? Authorizing Provider   chlorhexidine (PERIDEX) 0.12 % solution     Provider, MD Perfecto   Drysol 20 % external solution  21   Emergency, Nurse Breanna, RN   ergocalciferol (ERGOCALCIFEROL) 1.25 MG (17372 UT) capsule ergocalciferol (vitamin D2) 1,250 mcg (50,000 unit) capsule    Emergency, Nurse Epic, RN   naloxone (NARCAN) 4 MG/0.1ML nasal spray CALL 911. SPR CONTENTS OF ONE SPRAYER  "(0.1ML) INTO ONE NOSTRIL. REPEAT IN 2-3 MIN IF SYMPTOMS OF OPIOID EMERGENCY PERSIST, ALTERNATE NOSTRILS    Perfecto Orona MD   norethindrone (MICRONOR) 0.35 MG tablet Take 1 tablet by mouth Daily. 9/10/24   Amelia Mccall APRN   varenicline (CHANTIX) 1 MG tablet Take 1 tablet by mouth.  Patient not taking: Reported on 9/10/2024 8/20/24 2/16/25  Provider, MD Perfecto        Social History:   Social History     Tobacco Use    Smoking status: Every Day     Current packs/day: 0.25     Types: Cigarettes    Smokeless tobacco: Never    Tobacco comments:     2 PPD   Vaping Use    Vaping status: Never Used   Substance Use Topics    Alcohol use: No    Drug use: No         Review of Systems:  Review of Systems   Constitutional: Negative.    Eyes: Negative.    Respiratory: Negative.     Cardiovascular: Negative.    Gastrointestinal: Negative.    Endocrine: Negative.    Genitourinary: Negative.    Musculoskeletal: Negative.    Skin: Negative.    Allergic/Immunologic: Negative.    Neurological:  Positive for headaches.   Hematological: Negative.    Psychiatric/Behavioral: Negative.          Physical Exam:  /93 (BP Location: Right arm, Patient Position: Sitting)   Pulse 85   Temp 98.6 °F (37 °C) (Oral)   Resp 17   Ht 165.1 cm (65\")   Wt 83.1 kg (183 lb 3.2 oz)   SpO2 97%   BMI 30.49 kg/m²     Physical Exam  Constitutional:       Appearance: Normal appearance.   HENT:      Head: Normocephalic and atraumatic.      Nose: Nose normal.      Mouth/Throat:      Mouth: Mucous membranes are moist.   Eyes:      Pupils: Pupils are equal, round, and reactive to light.   Cardiovascular:      Rate and Rhythm: Normal rate and regular rhythm.      Pulses: Normal pulses.   Pulmonary:      Effort: Pulmonary effort is normal.      Breath sounds: Normal breath sounds.   Abdominal:      General: Abdomen is flat. Bowel sounds are normal.      Palpations: Abdomen is soft.   Musculoskeletal:         General: Normal range of " motion.      Cervical back: Normal range of motion.   Skin:     General: Skin is warm and dry.      Capillary Refill: Capillary refill takes less than 2 seconds.   Neurological:      General: No focal deficit present.      Mental Status: She is alert and oriented to person, place, and time. Mental status is at baseline.   Psychiatric:         Mood and Affect: Mood normal.                  Procedures:  Procedures      Medical Decision Making:      Comorbidities that affect care:    Hypertension    External Notes reviewed:    Previous Clinic Note: [facility] [place] yesterday      The following orders were placed and all results were independently analyzed by me:  Orders Placed This Encounter   Procedures    XR Chest 1 View    CT Head Without Contrast    [place] Draw    High Sensitivity Troponin T    Comprehensive Metabolic Panel    Lipase    BNP    Magnesium    CBC Auto Differential    High Sensitivity Troponin T 2Hr    NPO Diet NPO Type: Strict NPO    Undress & Gown    Continuous Pulse Oximetry    Oxygen Therapy- Nasal Cannula; Titrate 1-6 LPM Per SpO2; 90 - 95%    ECG 12 Lead ED Triage Standing Order; Chest Pain    ECG 12 Lead ED Triage Standing Order; Chest Pain    Insert Peripheral IV    CBC & Differential    Green Top (Gel)    Lavender Top    Gold Top - SST    Light Blue Top       Medications Given in the Emergency Department:  Medications   sodium chloride 0.9 % flush 10 mL (has no administration in time range)   sodium chloride 0.9 % bolus 1,000 mL (1,000 mL Intravenous New Bag 10/18/24 1549)   butalbital-acetaminophen-caffeine (ORBIVAN) -40 MG capsule 1 capsule (has no administration in time range)   aspirin chewable tablet 324 mg (324 mg Oral Given 10/18/24 1512)   ondansetron (ZOFRAN) injection 4 mg (4 mg Intravenous Given 10/18/24 1547)   ketorolac (TORADOL) injection 15 mg (15 mg Intravenous Given 10/18/24 1545)   diphenhydrAMINE (BENADRYL) injection 25 mg (25 mg Intravenous Given 10/18/24 1547)   dexAMETHasone  sodium phosphate injection 6 mg (6 mg Intravenous Given 10/18/24 1546)        ED Course:         Labs:    Lab Results (last 24 hours)       Procedure Component Value Units Date/Time    High Sensitivity Troponin T [[id number]]  (Normal) Collected: 10/18/24 1221    Specimen: Blood from Arm, Right Updated: 10/18/24 1300     HS Troponin T <6 ng/L     Narrative:      High Sensitive Troponin T Reference Range:  <14.0 ng/L- Negative Female for AMI  <22.0 ng/L- Negative Male for AMI  >=14 - Abnormal Female indicating possible myocardial injury.  >=22 - Abnormal Male indicating possible myocardial injury.   Clinicians would have to utilize clinical acumen, EKG, Troponin, and serial changes to determine if it is an Acute Myocardial Infarction or myocardial injury due to an underlying chronic condition.         CBC & Differential [[id number]]  (Abnormal) Collected: 10/18/24 1221    Specimen: Blood from Arm, Right Updated: 10/18/24 1234    Narrative:      The following orders were created for panel order CBC & Differential.  Procedure                               Abnormality         Status                     ---------                               -----------         ------                     CBC Auto Differential[[id number]]        Abnormal            Final result                 Please view results for these tests on the individual orders.    Comprehensive Metabolic Panel [[id number]]  (Abnormal) Collected: 10/18/24 1221    Specimen: Blood from Arm, Right Updated: 10/18/24 1251     Glucose 75 mg/dL      BUN 7 mg/dL      Creatinine 0.95 mg/dL      Sodium 141 mmol/L      Potassium 3.8 mmol/L      Chloride 102 mmol/L      CO2 29.2 mmol/L      Calcium 9.9 mg/dL      Total Protein 8.0 g/dL      Albumin 4.6 g/dL      ALT (SGPT) 14 U/L      AST (SGOT) 16 U/L      Alkaline Phosphatase 52 U/L      Total Bilirubin 0.3 mg/dL      Globulin 3.4 gm/dL      A/G Ratio 1.4 g/dL      BUN/Creatinine Ratio 7.4     Anion Gap 9.8 mmol/L      eGFR  77.4 mL/min/1.73     Narrative:      GFR Normal >60  Chronic Kidney Disease <60  Kidney Failure <15      Lipase [[id number]]  (Abnormal) Collected: 10/18/24 1221    Specimen: Blood from Arm, Right Updated: 10/18/24 1251     Lipase 152 U/L     BNP [[id number]]  (Normal) Collected: 10/18/24 1221    Specimen: Blood from Arm, Right Updated: 10/18/24 1300     proBNP <36.0 pg/mL     Narrative:      This assay is used as an aid in the diagnosis of individuals suspected of having heart failure. It can be used as an aid in the diagnosis of acute decompensated heart failure (ADHF) in patients presenting with signs and symptoms of ADHF to the emergency department (ED). In addition, NT-proBNP of <300 pg/mL indicates ADHF is not likely.    Age Range Result Interpretation  NT-proBNP Concentration (pg/mL:      <50             Positive            >450                   Gray                 300-450                    Negative             <300    50-75           Positive            >900                  Gray                300-900                  Negative            <300      >75             Positive            >1800                  Gray                300-1800                  Negative            <300    Magnesium [[id number]]  (Normal) Collected: 10/18/24 1221    Specimen: Blood from Arm, Right Updated: 10/18/24 1251     Magnesium 2.4 mg/dL     CBC Auto Differential [[id number]]  (Abnormal) Collected: 10/18/24 1221    Specimen: Blood from Arm, Right Updated: 10/18/24 1234     WBC 7.55 10*3/mm3      RBC 5.19 10*6/mm3      Hemoglobin 15.2 g/dL      Hematocrit 47.3 %      MCV 91.1 fL      MCH 29.3 pg      MCHC 32.1 g/dL      RDW 14.6 %      RDW-SD 49.1 fl      MPV 9.4 fL      Platelets 285 10*3/mm3      Neutrophil % 56.8 %      Lymphocyte % 34.4 %      Monocyte % 7.3 %      Eosinophil % 0.9 %      Basophil % 0.3 %      Immature Grans % 0.3 %      Neutrophils, Absolute 4.29 10*3/mm3      Lymphocytes, Absolute 2.60 10*3/mm3       Monocytes, Absolute 0.55 10*3/mm3      Eosinophils, Absolute 0.07 10*3/mm3      Basophils, Absolute 0.02 10*3/mm3      Immature Grans, Absolute 0.02 10*3/mm3      nRBC 0.0 /100 WBC     High Sensitivity Troponin T 2Hr [[id number]] Collected: 10/18/24 1522    Specimen: Blood from Arm, Right Updated: 10/18/24 1548     HS Troponin T <6 ng/L      Troponin T Delta --     Comment: Unable to calculate.       Narrative:      High Sensitive Troponin T Reference Range:  <14.0 ng/L- Negative Female for AMI  <22.0 ng/L- Negative Male for AMI  >=14 - Abnormal Female indicating possible myocardial injury.  >=22 - Abnormal Male indicating possible myocardial injury.   Clinicians would have to utilize clinical acumen, EKG, Troponin, and serial changes to determine if it is an Acute Myocardial Infarction or myocardial injury due to an underlying chronic condition.                  Imaging:    CT Head Without Contrast    Result Date: 10/18/2024  CT HEAD WO CONTRAST Date of Exam: 10/18/2024 3:58 PM EDT Indication: headache. Comparison: None available. Technique: Axial CT images were obtained of the head without contrast administration.  Reconstructed coronal and sagittal images were also obtained. Automated exposure control and iterative construction methods were used. Findings: Gray-white matter differentiation is maintained without evidence of an acute infarction. No intracranial mass or mass effect. No extra-axial mass or collection. The ventricles and sulci are normal in size and configuration. The posterior fossa appears normal. Sellar and suprasellar structures are normal. Orbital and paranasal soft tissues are normal. The paranasal sinuses, ethmoid air cells, and mastoid air cells are aerated. The bony calvarium appears intact. No acute fractures. No lytic or blastic bony diseases.     Impression: No acute intracranial pathology. Electronically Signed: Abel Vila MD  10/18/2024 4:05 PM EDT  Workstation ID: [id number]    XR Chest  1 View    Result Date: 10/18/2024  XR CHEST 1 VW Date of Exam: 10/18/2024 2:18 PM EDT Indication: Chest Pain Triage Protocol Comparison: None available. Findings: The heart size is normal and the lungs are clear     Impression: Normal single-view chest Electronically Signed: Julian Miller MD  10/18/2024 2:37 PM EDT  Workstation ID: [id number]       Differential Diagnosis and Discussion:    Headache: Differential diagnosis includes but is not limited to migraine, cluster headache, hypertension, tumor, subarachnoid bleeding, pseudotumor cerebri, temporal arteritis, infections, tension headache, and TMJ syndrome.    All labs were reviewed and interpreted by me.  CT scan radiology impression was interpreted by me.    MDM                     Patient Care Considerations:           Consultants/Shared Management Plan:    None    Social Determinants of Health:    Patient is independent, reliable, and has access to care.       Disposition and Care Coordination:    Discharged: The patient is suitable and stable for discharge with no need for consideration of admission.    I have explained the patient´s condition, diagnoses and treatment plan based on the information available to me at this time. I have answered questions and addressed any concerns. The patient has a good  understanding of the patient´s diagnosis, condition, and treatment plan as can be expected at this point. The vital signs have been stable. The patient´s condition is stable and appropriate for discharge from the emergency department.      The patient will pursue further outpatient evaluation with the primary care physician or other designated or consulting physician as outlined in the discharge instructions. They are agreeable to this plan of care and follow-up instructions have been explained in detail. The patient has received these instructions in written format and has expressed an understanding of the discharge instructions. The patient is aware that any  significant change in condition or worsening of symptoms should prompt an immediate return to this or the closest emergency department or call to 911.  I have explained discharge medications and the need for follow up with the patient/caretakers. This was also printed in the discharge instructions. Patient was discharged with the following medications and follow up:      Medication List      No changes were made to your prescriptions during this visit.      Kajal Hillman, APRN  [address] [id number]  [phone]    In 3 days         Final diagnoses:   Acute nonintractable headache, unspecified headache type   Essential hypertension        ED Disposition       ED Disposition   Discharge    Condition   Stable    Comment   --               This medical record created using voice recognition software.             Dora Russell, APRN  10/18/24 [id number]

## 2024-10-18 NOTE — Clinical Note
[facility] EMERGENCY ROOM  [address]  [place] [address]  Phone: [phone]  Fax: [phone]    Cristina Magana was seen and treated in our emergency department on 10/18/2024.  She may return to work on 10/22/2024.         Thank you for choosing [facility].    Dora Russell, APRN

## 2024-11-14 ENCOUNTER — PROCEDURE VISIT (OUTPATIENT)
Dept: OBSTETRICS AND GYNECOLOGY | Facility: CLINIC | Age: 39
End: 2024-11-14
Payer: COMMERCIAL

## 2024-11-14 ENCOUNTER — OFFICE VISIT (OUTPATIENT)
Dept: OBSTETRICS AND GYNECOLOGY | Facility: CLINIC | Age: 39
End: 2024-11-14
Payer: COMMERCIAL

## 2024-11-14 VITALS
HEIGHT: 65 IN | BODY MASS INDEX: 30.66 KG/M2 | SYSTOLIC BLOOD PRESSURE: 143 MMHG | DIASTOLIC BLOOD PRESSURE: 92 MMHG | WEIGHT: 184 LBS

## 2024-11-14 DIAGNOSIS — Z01.419 WOMEN'S ANNUAL ROUTINE GYNECOLOGICAL EXAMINATION: Primary | ICD-10-CM

## 2024-11-14 DIAGNOSIS — Z12.31 VISIT FOR SCREENING MAMMOGRAM: Primary | ICD-10-CM

## 2024-11-14 DIAGNOSIS — Z30.41 ENCOUNTER FOR SURVEILLANCE OF CONTRACEPTIVE PILLS: ICD-10-CM

## 2024-11-14 RX ORDER — VARENICLINE TARTRATE 1 MG/1
TABLET, FILM COATED ORAL
COMMUNITY
Start: 2024-11-12

## 2024-11-14 NOTE — PROGRESS NOTES
GYN Annual Exam     Chief Complaint   Patient presents with    Gynecologic Exam     Pt here today for AE, Mammo today      HPI    Cristina Magana is a 39 y.o. female who presents for annual well woman exam.  She is not currently sexually active. Periods are regular every 28-30 days, lasting 4 days. Dysmenorrhea:none. Cyclic symptoms include none. No intermenstrual bleeding, spotting, or discharge. Performing SBE:completes. Started POP in September for contraception and is happy with use. Negative STD screening in 2024. Prior care with Dr. Bacon.   OB History          3    Para   1    Term   1            AB   1    Living   2         SAB   1    IAB        Ectopic        Molar        Multiple        Live Births                  LMP- unsure of exact date. No IC in several months   Current contraception: oral progesterone-only contraceptive  Last Pap-  NIL, HPV negative  History of abnormal Pap smear: yes, denies hx dysplasia  History of STD-gonorrhea, trichomonas   Family history of uterine, colon or ovarian cancer: yes - sister ovarian  Family history of breast cancer: yes - cousin  Mammogram- today  History of abnormal mammogram: no    Past Medical History:   Diagnosis Date    Abnormal Pap smear of cervix     Gestational hypertension     Ovarian cyst     Vitamin D deficiency        Past Surgical History:   Procedure Laterality Date     SECTION      COLONOSCOPY  2018    [facility]    TONSILLECTOMY      UPPER GASTROINTESTINAL ENDOSCOPY  2018    Normal         Current Outpatient Medications:     chlorhexidine (PERIDEX) 0.12 % solution, , Disp: , Rfl:     Drysol 20 % external solution, , Disp: , Rfl:     ergocalciferol (ERGOCALCIFEROL) 1.25 MG (65174 UT) capsule, ergocalciferol (vitamin D2) 1,250 mcg (50,000 unit) capsule, Disp: , Rfl:     naloxone (NARCAN) 4 MG/0.1ML nasal spray, CALL 911. SPR CONTENTS OF ONE SPRAYER (0.1ML) INTO ONE NOSTRIL. REPEAT IN 2-3 MIN IF SYMPTOMS OF  "OPIOID EMERGENCY PERSIST, ALTERNATE NOSTRILS, Disp: , Rfl:     norethindrone (MICRONOR) 0.35 MG tablet, Take 1 tablet by mouth Daily., Disp: 91 tablet, Rfl: 3    varenicline (CHANTIX) 1 MG tablet, , Disp: , Rfl:     No Known Allergies    Social History     Tobacco Use    Smoking status: Some Days     Current packs/day: 0.25     Types: Cigarettes    Smokeless tobacco: Never    Tobacco comments:     2 PPD   Vaping Use    Vaping status: Never Used   Substance Use Topics    Alcohol use: No    Drug use: No       Family History   Problem Relation Age of Onset    Hypertension Mother     Heart attack Mother     Lung cancer Father     Ovarian cancer Sister     Hypertension Sister     Vitamin D deficiency Sister     Breast cancer Cousin        Review of Systems   Constitutional:  Negative for chills, fatigue and fever.   Gastrointestinal:  Negative for abdominal distention, abdominal pain, nausea and vomiting.   Endocrine: Negative for cold intolerance and heat intolerance.   Genitourinary:  Negative for breast discharge, breast lump, breast pain, dysuria, frequency, menstrual problem, pelvic pain, pelvic pressure, urgency, vaginal bleeding, vaginal discharge and vaginal pain.   Musculoskeletal:  Negative for gait problem.   Skin:  Negative for rash.   Neurological:  Negative for dizziness and headache.   Psychiatric/Behavioral:  Negative for behavioral problems.        /92   Ht 165.1 cm (65\")   Wt 83.5 kg (184 lb)   BMI 30.62 kg/m²     Physical Exam  Constitutional:       General: She is not in acute distress.     Appearance: Normal appearance. She is obese. She is not ill-appearing, toxic-appearing or diaphoretic.   Genitourinary:      Vulva, bladder and urethral meatus normal.      No lesions in the vagina.      Right Labia: No rash, tenderness, lesions, skin changes or Bartholin's cyst.     Left Labia: No tenderness, lesions, skin changes, Bartholin's cyst or rash.     No labial fusion noted.      No inguinal " adenopathy present in the right or left side.     No vaginal discharge, erythema, tenderness, bleeding or ulceration.      No vaginal prolapse present.     No vaginal atrophy present.       Right Adnexa: not tender, not full, not palpable, no mass present and not absent.     Left Adnexa: not tender, not full, not palpable, no mass present and not absent.     No cervical motion tenderness, discharge, friability, lesion, polyp, nabothian cyst or eversion.      Uterus is not enlarged, fixed, tender, irregular or prolapsed.      No uterine mass detected.     No urethral tenderness or mass present.      Pelvic exam was performed with patient in the lithotomy position.   Breasts:     Breasts are symmetrical.      Right: Present. No swelling, bleeding, inverted nipple, mass, nipple discharge, skin change, tenderness or breast implant.      Left: Present. No swelling, bleeding, inverted nipple, mass, nipple discharge, skin change, tenderness or breast implant.   HENT:      Head: Normocephalic and atraumatic.   Eyes:      Pupils: Pupils are equal, round, and reactive to light.   Cardiovascular:      Rate and Rhythm: Normal rate.   Pulmonary:      Effort: Pulmonary effort is normal.   Abdominal:      General: There is no distension.      Palpations: Abdomen is soft. There is no mass.      Tenderness: There is no abdominal tenderness. There is no guarding.      Hernia: No hernia is present. There is no hernia in the left inguinal area or right inguinal area.   Musculoskeletal:         General: Normal range of motion.      Cervical back: Normal range of motion and neck supple. No tenderness.   Lymphadenopathy:      Cervical: No cervical adenopathy.      Upper Body:      Right upper body: No supraclavicular, axillary or pectoral adenopathy.      Left upper body: No supraclavicular, axillary or pectoral adenopathy.      Lower Body: No right inguinal adenopathy. No left inguinal adenopathy.   Neurological:      General: No focal  deficit present.      Mental Status: She is alert and oriented to person, place, and time.      Cranial Nerves: No cranial nerve deficit.   Skin:     General: Skin is warm and dry.   Psychiatric:         Mood and Affect: Mood normal.         Behavior: Behavior normal.         Thought Content: Thought content normal.         Judgment: Judgment normal.   Vitals and nursing note reviewed.       Assessment   Diagnoses and all orders for this visit:    1. Women's annual routine gynecological examination (Primary)  -     IGP, Rfx Aptima HPV ASCU    2. Encounter for surveillance of contraceptive pills       Plan   Well woman exam: Pap smear collected. Recommend MVI daily.    Contraception: Continue POP at this time  STD: Enc condoms. Desires STD screen today- No. Recent negative testing  Smoking status: current every day smoker, currently has Rx for chantix   Encouraged annual mammogram screening starting at age 40. Instructed on how to perform SBE. Encouraged breast health self awareness.  6.    Encouraged 150 minutes of exercise per week if not medially contraindicated.   7.    Obese by BMI 30.6  8.   HTN: recently stopped medications to see if lifestyle changes would improve results. BP elevated at 143/92, this improved from last visit. She is followed by PCP for HTN.     Return in about 1 year (around 11/14/2025) for Annual physical, SUSAN Sheth.    SUSAN Agustin  11/14/2024  09:48 EST

## 2024-11-19 LAB
CONV .: NORMAL
CYTOLOGIST CVX/VAG CYTO: NORMAL
CYTOLOGY CVX/VAG DOC CYTO: NORMAL
CYTOLOGY CVX/VAG DOC THIN PREP: NORMAL
DX ICD CODE: NORMAL
Lab: NORMAL
OTHER STN SPEC: NORMAL
STAT OF ADQ CVX/VAG CYTO-IMP: NORMAL

## 2024-11-22 ENCOUNTER — TELEPHONE (OUTPATIENT)
Dept: GASTROENTEROLOGY | Facility: CLINIC | Age: 39
End: 2024-11-22

## 2024-11-22 NOTE — TELEPHONE ENCOUNTER
Caller: Cristina Magana    Relationship: Self    Best call back number: [phone]    What is the best time to reach you: ANYTIME    Who are you requesting to speak with (clinical staff, provider,  specific staff member):         What was the call regarding: PT ASKING ABOUT ACCOMMODATION PAPERWORK LAST PROVIDED IN 2019. PLEASE CONTACT PT TO ASSIST W/THIS REQUEST. POSSIBLY AN ADA ACCOMMODATION. PT NEEDS ACCESS TO THAT. ALSO REQUESTING AN UPDATED ADA FORM

## 2025-01-13 NOTE — PROGRESS NOTES
"GYN EXAM    Chief Complaint   Patient presents with    Follow-up     Here today for vaginal discharge ,itching ,burning and odor.        PRECIOUS Evans is a 39 y.o.  who presents with complaints of vaginal discharge, itching, burning, and odor.     Discharge:  endorses discharge  Itching: endorses  Vaginal odor: endorses  Pelvic pain: denies  Dysuria: denies  Urine odor: denies  New sexual partners: denies  Change in soaps or detergents: denies  Douching: denies    Past Medical History:   Diagnosis Date    Abnormal Pap smear of cervix     Gestational hypertension     Ovarian cyst     Vitamin D deficiency         Past Surgical History:   Procedure Laterality Date     SECTION      COLONOSCOPY  2018    [facility]    TONSILLECTOMY      UPPER GASTROINTESTINAL ENDOSCOPY  2018    Normal        Review of Systems   Constitutional:  Negative for chills, diaphoresis, fatigue and fever.   Genitourinary:  Positive for vaginal discharge. Negative for decreased urine volume, difficulty urinating, dyspareunia, dysuria, flank pain, frequency, genital sores, hematuria, pelvic pain, urgency and vaginal pain.   Hematological:  Negative for adenopathy.   All other systems reviewed and are negative.      OBJECTIVE     Vitals:    25 0926   BP: 137/87   Weight: 84.8 kg (187 lb)   Height: 165.1 cm (65\")        Physical Exam  Constitutional:       General: She is awake.      Appearance: Normal appearance. She is well-developed and well-groomed.   Genitourinary:      No lesions in the vagina.      Vaginal discharge present.      No vaginal erythema, tenderness or ulceration.      Cervical discharge present.      No cervical friability or lesion.   HENT:      Head: Normocephalic and atraumatic.   Pulmonary:      Effort: Pulmonary effort is normal.   Musculoskeletal:      Cervical back: Normal range of motion.   Neurological:      General: No focal deficit present.      Mental Status: She is alert and oriented to " person, place, and time.   Skin:     General: Skin is warm and dry.   Psychiatric:         Mood and Affect: Mood normal.         Behavior: Behavior normal. Behavior is cooperative.   Vitals reviewed.         ASSESSMENT/PLAN    Diagnoses and all orders for this visit:    1. Vaginal discharge (Primary)  -     NuSwab VG+ - Swab, Vagina  -     Genital Mycoplasmas ABBY, Swab - Swab, Cervix  -     Genital Culture - Swab, Vagina  -     Bacillus Coagulans-Inulin (Probiotic-Prebiotic) 1-250 BILLION-MG capsule; Take 2 capsules by mouth Daily.  Dispense: 30 capsule; Refill: 11  -     metroNIDAZOLE (FLAGYL) 500 MG tablet; Take 1 tablet by mouth 2 (Two) Times a Day for 7 days.  Dispense: 14 tablet; Refill: 0    2. Vaginal burning  -     NuSwab VG+ - Swab, Vagina  -     Genital Mycoplasmas ABBY, Swab - Swab, Cervix  -     Genital Culture - Swab, Vagina  -     Bacillus Coagulans-Inulin (Probiotic-Prebiotic) 1-250 BILLION-MG capsule; Take 2 capsules by mouth Daily.  Dispense: 30 capsule; Refill: 11  -     metroNIDAZOLE (FLAGYL) 500 MG tablet; Take 1 tablet by mouth 2 (Two) Times a Day for 7 days.  Dispense: 14 tablet; Refill: 0    3. Vaginal odor  -     NuSwab VG+ - Swab, Vagina  -     Genital Mycoplasmas ABBY, Swab - Swab, Cervix  -     Genital Culture - Swab, Vagina  -     Bacillus Coagulans-Inulin (Probiotic-Prebiotic) 1-250 BILLION-MG capsule; Take 2 capsules by mouth Daily.  Dispense: 30 capsule; Refill: 11  -     metroNIDAZOLE (FLAGYL) 500 MG tablet; Take 1 tablet by mouth 2 (Two) Times a Day for 7 days.  Dispense: 14 tablet; Refill: 0    Vaginal cultures obtained and sent. Will notify patient of results when available and treat if indicated.   Encouraged condoms with intercourse, cotton only underwear, mild or no soaps, avoidance of douching or patricia steaming.   Encouraged adding probiotic daily or vaginal boric acid suppositories.     Return if symptoms worsen or fail to improve.    I spent 30 minutes caring for Cristina on this  date of service. This time includes time spent by me in the following activities: preparing for the visit, reviewing tests, performing a medically appropriate examination and/or evaluation, counseling and educating the patient/family/caregiver, referring and communicating with other health care professionals, documenting information in the medical record, independently interpreting results and communicating that information with the patient/family/caregiver, care coordination, ordering medications, ordering test(s), ordering procedure(s), obtaining a separately obtained history, and reviewing a separately obtained history.    Acacia Adkins CNM  1/19/2025  12:42 EST

## 2025-01-14 ENCOUNTER — OFFICE VISIT (OUTPATIENT)
Dept: OBSTETRICS AND GYNECOLOGY | Facility: CLINIC | Age: 40
End: 2025-01-14
Payer: COMMERCIAL

## 2025-01-14 VITALS
WEIGHT: 187 LBS | BODY MASS INDEX: 31.16 KG/M2 | DIASTOLIC BLOOD PRESSURE: 87 MMHG | SYSTOLIC BLOOD PRESSURE: 137 MMHG | HEIGHT: 65 IN

## 2025-01-14 DIAGNOSIS — N89.8 VAGINAL ODOR: ICD-10-CM

## 2025-01-14 DIAGNOSIS — N89.8 VAGINAL DISCHARGE: Primary | ICD-10-CM

## 2025-01-14 DIAGNOSIS — N94.89 VAGINAL BURNING: ICD-10-CM

## 2025-01-14 RX ORDER — TEA TREE OIL 100 %
2 OIL (ML) TOPICAL DAILY
Qty: 30 CAPSULE | Refills: 11 | Status: SHIPPED | OUTPATIENT
Start: 2025-01-14

## 2025-01-14 RX ORDER — METRONIDAZOLE 500 MG/1
500 TABLET ORAL 2 TIMES DAILY
Qty: 14 TABLET | Refills: 0 | Status: SHIPPED | OUTPATIENT
Start: 2025-01-14 | End: 2025-01-21

## 2025-01-18 LAB
BACTERIA GENITAL AEROBE CULT: ABNORMAL
BACTERIA SPEC CULT: ABNORMAL
BACTERIA SPEC CULT: ABNORMAL

## 2025-01-20 RX ORDER — FLUCONAZOLE 150 MG/1
150 TABLET ORAL
Qty: 2 TABLET | Refills: 0 | Status: SHIPPED | OUTPATIENT
Start: 2025-01-20

## 2025-01-23 LAB
M GENITALIUM DNA SPEC QL NAA+PROBE: NEGATIVE
M HOMINIS DNA SPEC QL NAA+PROBE: POSITIVE
UREAPLASMA DNA SPEC QL NAA+PROBE: POSITIVE

## 2025-01-23 RX ORDER — DOXYCYCLINE 100 MG/1
100 TABLET ORAL 2 TIMES DAILY
Qty: 14 TABLET | Refills: 0 | Status: SHIPPED | OUTPATIENT
Start: 2025-01-23 | End: 2025-01-30